# Patient Record
Sex: FEMALE | Race: WHITE | NOT HISPANIC OR LATINO | Employment: UNEMPLOYED | URBAN - METROPOLITAN AREA
[De-identification: names, ages, dates, MRNs, and addresses within clinical notes are randomized per-mention and may not be internally consistent; named-entity substitution may affect disease eponyms.]

---

## 2017-06-09 ENCOUNTER — OFFICE VISIT (OUTPATIENT)
Dept: CARDIOLOGY | Facility: MEDICAL CENTER | Age: 52
End: 2017-06-09
Payer: MEDICAID

## 2017-06-09 ENCOUNTER — TELEPHONE (OUTPATIENT)
Dept: CARDIOLOGY | Facility: MEDICAL CENTER | Age: 52
End: 2017-06-09

## 2017-06-09 VITALS
HEART RATE: 84 BPM | WEIGHT: 246 LBS | SYSTOLIC BLOOD PRESSURE: 144 MMHG | HEIGHT: 65 IN | OXYGEN SATURATION: 93 % | BODY MASS INDEX: 40.98 KG/M2 | DIASTOLIC BLOOD PRESSURE: 90 MMHG

## 2017-06-09 DIAGNOSIS — I50.9 CONGESTIVE HEART FAILURE, UNSPECIFIED CONGESTIVE HEART FAILURE CHRONICITY, UNSPECIFIED CONGESTIVE HEART FAILURE TYPE: ICD-10-CM

## 2017-06-09 DIAGNOSIS — Z82.49 FAMILY HISTORY OF PREMATURE CAD: ICD-10-CM

## 2017-06-09 DIAGNOSIS — R00.2 PALPITATIONS: ICD-10-CM

## 2017-06-09 DIAGNOSIS — R06.09 DOE (DYSPNEA ON EXERTION): ICD-10-CM

## 2017-06-09 DIAGNOSIS — E66.01 MORBID OBESITY WITH BMI OF 40.0-44.9, ADULT (HCC): ICD-10-CM

## 2017-06-09 LAB — EKG IMPRESSION: NORMAL

## 2017-06-09 PROCEDURE — 93000 ELECTROCARDIOGRAM COMPLETE: CPT | Performed by: INTERNAL MEDICINE

## 2017-06-09 PROCEDURE — 99204 OFFICE O/P NEW MOD 45 MIN: CPT | Performed by: INTERNAL MEDICINE

## 2017-06-09 RX ORDER — POTASSIUM CHLORIDE 750 MG/1
10 CAPSULE, EXTENDED RELEASE ORAL 2 TIMES DAILY
COMMUNITY

## 2017-06-09 RX ORDER — FLUOXETINE 10 MG/1
10 CAPSULE ORAL DAILY
COMMUNITY

## 2017-06-09 RX ORDER — LOSARTAN POTASSIUM 50 MG/1
50 TABLET ORAL DAILY
COMMUNITY

## 2017-06-09 RX ORDER — FUROSEMIDE 20 MG/1
20 TABLET ORAL 2 TIMES DAILY
COMMUNITY

## 2017-06-09 NOTE — MR AVS SNAPSHOT
"Yessica Fry   2017 2:00 PM   Office Visit   MRN: [id number]    Department:  Heart [facility]   Dept Phone:  [phone]    Description:  Female : 1965   Provider:  Timothy Arenas MD,[facility]           Reason for Visit     New Member Activation     CHF (Acute)           Allergies as of 2017     Allergen Noted Reactions    Keflex 2017   Unspecified    Pt. States she ended up in the hospital      You were diagnosed with     Congestive heart failure, unspecified congestive heart failure chronicity, unspecified congestive heart failure type (CMS-[facility])   [1756933]       Morbid obesity with BMI of 40.0-44.9, adult (CMS-[facility])   [680681]       Peripartum cardiomyopathy   [673206]   1994    Palpitations   [785.1.ICD-9-CM]       SANTOS (dyspnea on exertion)   [102462]       Family history of premature CAD   [528642]   Father 54 y/o MI and       Vital Signs     Blood Pressure Pulse Height Weight Body Mass Index Oxygen Saturation    144/90 mmHg 84 1.651 m (5' 5\") 111.585 kg (246 lb) 40.94 kg/m2 93%    Smoking Status                   Never Smoker            Basic Information     Date Of Birth Sex Race Ethnicity Preferred Language    1965 Female Unable to Obtain Non- English      Your appointments     2017 12:15 PM   ECHO with ECHO [facility] EXAM 12   ECHOCARDIOLOGY [facility] ([facility])    [address] [facility] [address] [address]   [phone]            2017  2:00 PM   HOLTER MONITOR 48 HRS with [facility]   [facility] (--)    [address]  [place] [id number] [phone]            Aug 09, 2017  1:15 PM   FOLLOW UP with Timothy Arenas MD,[facility] (--)    [address]  [address]  [facility] [id number] [phone]              Health Maintenance     Patient has no pending health maintenance at this time      Results       Current Immunizations     No " immunizations on file.      Below and/or attached are the medications your provider expects you to take. Review all of your home medications and newly ordered medications with your provider and/or pharmacist. Follow medication instructions as directed by your provider and/or pharmacist. Please keep your medication list with you and share with your provider. Update the information when medications are discontinued, doses are changed, or new medications (including over-the-counter products) are added; and carry medication information at all times in the event of emergency situations     Allergies:  KEFLEX - Unspecified               Medications  Valid as of: June 09, 2017 -  2:44 PM    Generic Name Brand Name Tablet Size Instructions for use    FLUoxetine HCl (Cap) PROZAC 10 MG Take 10 mg by mouth every day.        Furosemide (Tab) LASIX 20 MG Take 20 mg by mouth 2 times a day.        Losartan Potassium (Tab) COZAAR 50 MG Take 50 mg by mouth every day.        Metoprolol Tartrate (Tab) LOPRESSOR 25 MG Take 25 mg by mouth 2 times a day.        Potassium Chloride (Cap CR) MICRO-K 10 MEQ Take 10 mEq by mouth 2 times a day.        .                 Medicines prescribed today were [facility]:     [facility] [address] [address]    Phone: [phone] Fax: [phone]    Open 24 Hours?: No      Medication refill instructions:       If your prescription bottle indicates you have medication refills left, it is not necessary to call your provider’s office. Please contact your pharmacy and they will refill your medication.    If your prescription bottle indicates you do not have any refills left, you may request refills at any time through one of the following ways: The online [employer] system (except Urgent Care), by calling your provider’s office, or by asking your pharmacy to contact your provider’s office with a refill request. Medication refills are processed only during regular  business hours and may not be available until the next business day. Your provider may request additional information or to have a follow-up visit with you prior to refilling your medication.   *Please Note: Medication refills are assigned a new Rx number when refilled electronically. Your pharmacy may indicate that no refills were authorized even though a new prescription for the same medication is available at the pharmacy. Please request the medicine by name with the pharmacy before contacting your provider for a refill.        Your To Do List     Future Labs/Procedures Complete By Expires    COMP METABOLIC PANEL  As directed 6/9/2018    ECHOCARDIOGRAM COMP W/O CONT  As directed 6/9/2018    HOLTER MONITOR STUDY  As directed 6/9/2018    MAGNESIUM  As directed 6/9/2018    NM-CARDIAC STRESS TEST  As directed 12/7/2017    TSH  As directed 6/9/2018         [employer] Access Code: [email or web address]  Expires: 7/2/2017  1:40 PM    [employer]  A secure, online tool to manage your health information     [employer]® is a secure, online tool that connects you to your personalized health information from the privacy of your home -- day or night - making it very easy for you to manage your healthcare. Once the activation process is completed, you can even access your medical information using the [employer] alexei, which is available for free in the Apple Alexei store or Google Play store.     [employer] provides the following levels of access (as shown below):   My Chart Features   [facility] Doctor [facility]  Specialists [facility]  Urgent  Care [facility]  Primary Care  Doctor   Email your healthcare team securely and privately 24/7 X X X    Manage appointments: schedule your next appointment; view details of past/upcoming appointments X      Request prescription refills. X      View recent personal medical records, including lab and immunizations X X X X   View health record, including health history, allergies, medications X  X X X   Read reports about your outpatient visits, procedures, consult and ER notes X X X X   See your discharge summary, which is a recap of your hospital and/or ER visit that includes your diagnosis, lab results, and care plan. X X       How to register for [employer]:  1. Go to  [email or web address].  2. Click on the Sign Up Now box, which takes you to the New Member Sign Up page. You will need to provide the following information:  a. Enter your [employer] Access Code exactly as it appears at the top of this page. (You will not need to use this code after you’ve completed the sign-up process. If you do not sign up before the expiration date, you must request a new code.)   b. Enter your date of birth.   c. Enter your home email address.   d. Click Submit, and follow the next screen’s instructions.  3. Create a [employer] ID. This will be your [employer] login ID and cannot be changed, so think of one that is secure and easy to remember.  4. Create a [employer] password. You can change your password at any time.  5. Enter your Password Reset Question and Answer. This can be used at a later time if you forget your password.   6. Enter your e-mail address. This allows you to receive e-mail notifications when new information is available in [employer].  7. Click Sign Up. You can now view your health information.    For assistance activating your [employer] account, call [phone]

## 2017-06-09 NOTE — PROGRESS NOTES
Cardiology Consult Note:    Timothy Arenas  Date & Time note created:    6/9/2017   2:22 PM       Patient ID:  Name:             Yessica Fry     YOB: 1965  Age:                 51 y.o.  female   MRN:               [id number]                                                             Chief Complaint:   Chief Complaint   Patient presents with   • New Member Activation   • CHF (Acute)             History of Present Illness:   Yessica Fry has peripartum CM 1994 [place], [place]; and recurrent CHF last yr; Doing well recently; Abn EKG, c/o increased palpitation; Sleeps problem and anxiety;     Review of Systems:     Constitutional: Denies fevers, Denies weight changes  Eyes: Denies changes in vision, no eye pain  Ears/Nose/Throat/Mouth: Denies nasal congestion or sore throat   Cardiovascular: Denies chest pain but palpitations   Respiratory: Denies shortness of breath , Some cough  Gastrointestinal/Hepatic: Denies abdominal pain, nausea, vomiting, diarrhea, constipation or GI bleeding   Genitourinary: Denies bladder dysfunction, dysuria or frequency  Musculoskeletal/Rheum: Denies  joint pain and swelling   Skin/Breast: Denies rash, denies breast lumps or discharge  Neurological: Denies headache, confusion, memory loss or focal weakness/parasthesias  Psychiatric: denies mood disorder   Endocrine: denies hx of diabetes or thyroid dysfunction  Heme/Oncology/Lymph Nodes: Denies enlarged lymph nodes, denies bruising or known bleeding disorder  Allergic/Immunologic: hx of allergies      All other systems were reviewed and are negative (AMA/CMS criteria)    Past Medical History:   History reviewed. See above and below mentioned    Past Surgical History:  History reviewed. No pertinent past surgical history.    Hospital Medications:    Current outpatient prescriptions:   •  losartan (COZAAR) 50 MG Tab, Take 50 mg by mouth every day., Disp: , Rfl:   •  fluoxetine (PROZAC) 10 MG Cap, Take 10 mg by mouth every  "day., Disp: , Rfl:   •  furosemide (LASIX) 20 MG Tab, Take 20 mg by mouth 2 times a day., Disp: , Rfl:   •  metoprolol (LOPRESSOR) 25 MG Tab, Take 25 mg by mouth 2 times a day., Disp: , Rfl:   •  potassium chloride (MICRO-K) 10 MEQ capsule, Take 10 mEq by mouth 2 times a day., Disp: , Rfl:     Current Outpatient Medications:  Current Outpatient Prescriptions   Medication Sig Dispense Refill   • losartan (COZAAR) 50 MG Tab Take 50 mg by mouth every day.     • fluoxetine (PROZAC) 10 MG Cap Take 10 mg by mouth every day.     • furosemide (LASIX) 20 MG Tab Take 20 mg by mouth 2 times a day.     • metoprolol (LOPRESSOR) 25 MG Tab Take 25 mg by mouth 2 times a day.     • potassium chloride (MICRO-K) 10 MEQ capsule Take 10 mEq by mouth 2 times a day.       No current facility-administered medications for this visit.         Medication Allergy/Sensitivities:  Allergies   Allergen Reactions   • Keflex Unspecified     Pt. States she ended up in the hospital       Family History:  F hx premature CAD  History reviewed.     Social History:  Social History     Social History   • Marital Status: Unknown     Spouse Name: N/A   • Number of Children: N/A   • Years of Education: N/A     Occupational History   • Not on file.     Social History Main Topics   • Smoking status: Never Smoker    • Smokeless tobacco: Never Used   • Alcohol Use: Not on file   • Drug Use: Not on file   • Sexual Activity: Not on file     Other Topics Concern   • Not on file     Social History Narrative   • No narrative on file         Physical Exam:  Vitals  Weight/BMI: Body mass index is 40.94 kg/(m^2).  Blood pressure 144/90, pulse 84, height 1.651 m (5' 5\"), weight 111.585 kg (246 lb), SpO2 93 %.  Filed Vitals:    06/09/17 1404   BP: 144/90   Pulse: 84   Height: 1.651 m (5' 5\")   Weight: 111.585 kg (246 lb)   SpO2: 93%     Oxygen Therapy:  Pulse Oximetry: 93 %  General Appearance:  MO;  Well developed, Well nourished, No acute distress, Non-toxic appearance. "   HENT:  Normocephalic, Atraumatic, Oropharynx moist mucous membranes, Dentition: Mallampati 4 OP, Nose normal.    Eyes:  PERRLA, EOMI, Conjunctiva normal, No discharge.  Neck:  Normal range of motion, No cervical tenderness, Supple, No stridor, no JVD .  No thyromegaly.  No carotid bruit.  Cardiovascular:  Normal heart rate, Normal rhythm,  S1, S2, no S3,  S4; No gallops; No murmurs, No rubs, .   Extremitites with intact distal pulses, no cyanosis, clubbing or edema.  No heaves, thrills, HJR;  Peripheral pulses: carotid 2+, brachial 2+, radial 2+, ulnar 2+, femoral 2+, popliteal 2+, PT 2+, DP 2+;  Lungs:  Respiratory effort is normal. Normal breath sounds, breath sounds clear to auscultation bilaterally,  no rales, no rhonchi, no wheezing.   Abdomen: Bowel sounds normal, Soft, No tenderness, No guarding, No rebound, No masses, No hepatosplenomegaly.  Skin: Warm, Dry, No erythema, No rash, no induration or crepitus.  Neurologic: Alert & oriented x 3, Normal motor function, Normal sensory function, No focal deficits noted, cranial nerves II through XII are normal,  normal gait.  Psychiatric: Affect normal, Judgment normal, Mood normal.      Data Review:     Records reviewed and summarized in current documentation    Lab Data Review:  No results found for: SODIUM, POTASSIUM, CHLORIDE, CO2, GLUCOSE, BUN, CREATININE, BUNCREATRAT, GLOMRATE   No results found for: PROTHROMBTM, INR   No results found for: WBC, RBC, HEMOGLOBIN, HEMATOCRIT, MCV, MCH, MCHC, MPV, NEUTSPOLYS, LYMPHOCYTES, MONOCYTES, EOSINOPHILS, BASOPHILS, HYPOCHROMIA, ANISOCYTOSIS     Imaging/Procedures Review:    To my review shows:na    EKG To my review shows:SR BL LAD, with ant MI age indetermined; abn T waves;  QTc 500 msec(?)    Assessment and Plan.   51 y.o. female has peripartum CM with increased SANTOS; pls see orders for eval; Discussed with patient the OPO ordered, may order O2 supplement and/or  sleep evaluation if OPO findings are abnormal; the  patient will be contacted in the future regarding my advisement.      1. Congestive heart failure, unspecified congestive heart failure chronicity, unspecified congestive heart failure type (CMS-[facility])    - EKG  - ECHOCARDIOGRAM COMP W/O CONT; Future  - HOLTER MONITOR STUDY; Future  - CBC WITHOUT DIFFERENTIAL  - COMP METABOLIC PANEL; Future  - LIPID PANEL  - TSH; Future  - MAGNESIUM; Future    2. Morbid obesity with BMI of 40.0-44.9, adult (CMS-HCC)  RD consult  DASH diet    3. Peripartum cardiomyopathy  Discussed risks and educated about the subject related matters    - ECHOCARDIOGRAM COMP W/O CONT; Future    4. Palpitations    - HOLTER MONITOR STUDY; Future  - CBC WITHOUT DIFFERENTIAL  - COMP METABOLIC PANEL; Future  - TSH; Future  - MAGNESIUM; Future      Return to clinic in  2 months  1. Congestive heart failure, unspecified congestive heart failure chronicity, unspecified congestive heart failure type (CMS-[facility])  EKG    ECHOCARDIOGRAM COMP W/O CONT    HOLTER MONITOR STUDY    CBC WITHOUT DIFFERENTIAL    COMP METABOLIC PANEL    LIPID PANEL    TSH    MAGNESIUM   2. Morbid obesity with BMI of 40.0-44.9, adult (CMS-HCC)     3. Peripartum cardiomyopathy  ECHOCARDIOGRAM COMP W/O CONT    1994   4. Palpitations  HOLTER MONITOR STUDY    CBC WITHOUT DIFFERENTIAL    COMP METABOLIC PANEL    TSH    MAGNESIUM

## 2017-06-09 NOTE — Clinical Note
[facility]   [address]   [address]  [place] [address]  Phone: [phone]  Fax: [phone]              Yessica Fry  1965    Encounter Date: 6/9/2017    Maria Luz Claire M.D.    Thank you for the referral. I had the pleasure of seeing Yessica Fry today in cardiology clinic. I've attached my visit note below. If you have any questions please feel free to give me a call anytime.      Timothy Arenas MD, PhD, FACC  Cardiology and Lipidology  [facility]                                                                  PROGRESS NOTE:  Cardiology Consult Note:    Timothy Arenas  Date & Time note created:    6/9/2017   2:22 PM       Patient ID:  Name:             Yessica Fry     YOB: 1965  Age:                 51 y.o.  female   MRN:               [id number]                                                             Chief Complaint:   Chief Complaint   Patient presents with   • New Member Activation   • CHF (Acute)             History of Present Illness:   Yessica Fry has peripartum CM 1994 [place], [place]; and recurrent CHF last yr; Doing well recently; Abn EKG, c/o increased palpitation; Sleeps problem and anxiety;     Review of Systems:     Constitutional: Denies fevers, Denies weight changes  Eyes: Denies changes in vision, no eye pain  Ears/Nose/Throat/Mouth: Denies nasal congestion or sore throat   Cardiovascular: Denies chest pain but palpitations   Respiratory: Denies shortness of breath , Some cough  Gastrointestinal/Hepatic: Denies abdominal pain, nausea, vomiting, diarrhea, constipation or GI bleeding   Genitourinary: Denies bladder dysfunction, dysuria or frequency  Musculoskeletal/Rheum: Denies  joint pain and swelling   Skin/Breast: Denies rash, denies breast lumps or discharge  Neurological: Denies headache, confusion, memory loss or focal  weakness/parasthesias  Psychiatric: denies mood disorder   Endocrine: denies hx of diabetes or thyroid dysfunction  Heme/Oncology/Lymph Nodes: Denies enlarged lymph nodes, denies bruising or known bleeding disorder  Allergic/Immunologic: hx of allergies      All other systems were reviewed and are negative (AMA/CMS criteria)    Past Medical History:   History reviewed. See above and below mentioned    Past Surgical History:  History reviewed. No pertinent past surgical history.    Hospital Medications:    Current outpatient prescriptions:   •  losartan (COZAAR) 50 MG Tab, Take 50 mg by mouth every day., Disp: , Rfl:   •  fluoxetine (PROZAC) 10 MG Cap, Take 10 mg by mouth every day., Disp: , Rfl:   •  furosemide (LASIX) 20 MG Tab, Take 20 mg by mouth 2 times a day., Disp: , Rfl:   •  metoprolol (LOPRESSOR) 25 MG Tab, Take 25 mg by mouth 2 times a day., Disp: , Rfl:   •  potassium chloride (MICRO-K) 10 MEQ capsule, Take 10 mEq by mouth 2 times a day., Disp: , Rfl:     Current Outpatient Medications:  Current Outpatient Prescriptions   Medication Sig Dispense Refill   • losartan (COZAAR) 50 MG Tab Take 50 mg by mouth every day.     • fluoxetine (PROZAC) 10 MG Cap Take 10 mg by mouth every day.     • furosemide (LASIX) 20 MG Tab Take 20 mg by mouth 2 times a day.     • metoprolol (LOPRESSOR) 25 MG Tab Take 25 mg by mouth 2 times a day.     • potassium chloride (MICRO-K) 10 MEQ capsule Take 10 mEq by mouth 2 times a day.       No current facility-administered medications for this visit.         Medication Allergy/Sensitivities:  Allergies   Allergen Reactions   • Keflex Unspecified     Pt. States she ended up in the hospital       Family History:  F hx premature CAD  History reviewed.     Social History:  Social History     Social History   • Marital Status: Unknown     Spouse Name: N/A   • Number of Children: N/A   • Years of Education: N/A     Occupational History   • Not on file.     Social History Main Topics   •  "Smoking status: Never Smoker    • Smokeless tobacco: Never Used   • Alcohol Use: Not on file   • Drug Use: Not on file   • Sexual Activity: Not on file     Other Topics Concern   • Not on file     Social History Narrative   • No narrative on file         Physical Exam:  Vitals  Weight/BMI: Body mass index is 40.94 kg/(m^2).  Blood pressure 144/90, pulse 84, height 1.651 m (5' 5\"), weight 111.585 kg (246 lb), SpO2 93 %.  Filed Vitals:    06/09/17 1404   BP: 144/90   Pulse: 84   Height: 1.651 m (5' 5\")   Weight: 111.585 kg (246 lb)   SpO2: 93%     Oxygen Therapy:  Pulse Oximetry: 93 %  General Appearance:  MO;  Well developed, Well nourished, No acute distress, Non-toxic appearance.   HENT:  Normocephalic, Atraumatic, Oropharynx moist mucous membranes, Dentition: Mallampati 4 OP, Nose normal.    Eyes:  PERRLA, EOMI, Conjunctiva normal, No discharge.  Neck:  Normal range of motion, No cervical tenderness, Supple, No stridor, no JVD .  No thyromegaly.  No carotid bruit.  Cardiovascular:  Normal heart rate, Normal rhythm,  S1, S2, no S3,  S4; No gallops; No murmurs, No rubs, .   Extremitites with intact distal pulses, no cyanosis, clubbing or edema.  No heaves, thrills, HJR;  Peripheral pulses: carotid 2+, brachial 2+, radial 2+, ulnar 2+, femoral 2+, popliteal 2+, PT 2+, DP 2+;  Lungs:  Respiratory effort is normal. Normal breath sounds, breath sounds clear to auscultation bilaterally,  no rales, no rhonchi, no wheezing.   Abdomen: Bowel sounds normal, Soft, No tenderness, No guarding, No rebound, No masses, No hepatosplenomegaly.  Skin: Warm, Dry, No erythema, No rash, no induration or crepitus.  Neurologic: Alert & oriented x 3, Normal motor function, Normal sensory function, No focal deficits noted, cranial nerves II through XII are normal,  normal gait.  Psychiatric: Affect normal, Judgment normal, Mood normal.      Data Review:     Records reviewed and summarized in current documentation    Lab Data Review:  No " results found for: SODIUM, POTASSIUM, CHLORIDE, CO2, GLUCOSE, BUN, CREATININE, BUNCREATRAT, GLOMRATE   No results found for: PROTHROMBTM, INR   No results found for: WBC, RBC, HEMOGLOBIN, HEMATOCRIT, MCV, MCH, MCHC, MPV, NEUTSPOLYS, LYMPHOCYTES, MONOCYTES, EOSINOPHILS, BASOPHILS, HYPOCHROMIA, ANISOCYTOSIS     Imaging/Procedures Review:    To my review shows:na    EKG To my review shows:SR BL LAD, with ant MI age indetermined; abn T waves;  QTc 500 msec(?)    Assessment and Plan.   51 y.o. female has peripartum CM with increased SANTOS; pls see orders for eval; Discussed with patient the OPO ordered, may order O2 supplement and/or  sleep evaluation if OPO findings are abnormal; the patient will be contacted in the future regarding my advisement.      1. Congestive heart failure, unspecified congestive heart failure chronicity, unspecified congestive heart failure type (CMS-HCC)    - EKG  - ECHOCARDIOGRAM COMP W/O CONT; Future  - HOLTER MONITOR STUDY; Future  - CBC WITHOUT DIFFERENTIAL  - COMP METABOLIC PANEL; Future  - LIPID PANEL  - TSH; Future  - MAGNESIUM; Future    2. Morbid obesity with BMI of 40.0-44.9, adult (CMS-HCC)  RD consult  DASH diet    3. Peripartum cardiomyopathy  Discussed risks and educated about the subject related matters    - ECHOCARDIOGRAM COMP W/O CONT; Future    4. Palpitations    - HOLTER MONITOR STUDY; Future  - CBC WITHOUT DIFFERENTIAL  - COMP METABOLIC PANEL; Future  - TSH; Future  - MAGNESIUM; Future      Return to clinic in  2 months  1. Congestive heart failure, unspecified congestive heart failure chronicity, unspecified congestive heart failure type (CMS-HCC)  EKG    ECHOCARDIOGRAM COMP W/O CONT    HOLTER MONITOR STUDY    CBC WITHOUT DIFFERENTIAL    COMP METABOLIC PANEL    LIPID PANEL    TSH    MAGNESIUM   2. Morbid obesity with BMI of 40.0-44.9, adult (CMS-HCC)     3. Peripartum cardiomyopathy  ECHOCARDIOGRAM COMP W/O CONT    1994   4. Palpitations  HOLTER MONITOR STUDY    CBC WITHOUT  DIFFERENTIAL    COMP METABOLIC PANEL    TSH    MAGNESIUM         Maria Luz Claire M.D.  [address] [facility] [address]  VIA Facsimile: [phone]

## 2017-07-12 ENCOUNTER — TELEPHONE (OUTPATIENT)
Dept: CARDIOLOGY | Facility: MEDICAL CENTER | Age: 52
End: 2017-07-12

## 2017-07-12 ENCOUNTER — NON-PROVIDER VISIT (OUTPATIENT)
Dept: CARDIOLOGY | Facility: MEDICAL CENTER | Age: 52
End: 2017-07-12
Payer: MEDICAID

## 2017-07-12 ENCOUNTER — HOSPITAL ENCOUNTER (OUTPATIENT)
Dept: CARDIOLOGY | Facility: MEDICAL CENTER | Age: 52
End: 2017-07-12
Attending: INTERNAL MEDICINE
Payer: MEDICAID

## 2017-07-12 DIAGNOSIS — G47.34 NOCTURNAL HYPOXEMIA: ICD-10-CM

## 2017-07-12 DIAGNOSIS — I50.9 CONGESTIVE HEART FAILURE, UNSPECIFIED CONGESTIVE HEART FAILURE CHRONICITY, UNSPECIFIED CONGESTIVE HEART FAILURE TYPE: ICD-10-CM

## 2017-07-12 DIAGNOSIS — I49.3 PVC (PREMATURE VENTRICULAR CONTRACTION): ICD-10-CM

## 2017-07-12 DIAGNOSIS — R00.0 SINUS TACHYCARDIA: ICD-10-CM

## 2017-07-12 DIAGNOSIS — R00.2 PALPITATIONS: ICD-10-CM

## 2017-07-12 LAB
LV EJECT FRACT  99904: 55
LV EJECT FRACT MOD 2C 99903: 56.69
LV EJECT FRACT MOD 4C 99902: 49.27
LV EJECT FRACT MOD BP 99901: 60.54

## 2017-07-12 PROCEDURE — 93306 TTE W/DOPPLER COMPLETE: CPT | Mod: 26 | Performed by: INTERNAL MEDICINE

## 2017-07-12 PROCEDURE — 93306 TTE W/DOPPLER COMPLETE: CPT

## 2017-07-12 NOTE — TELEPHONE ENCOUNTER
----- Message from Timothy Arenas MD,[facility] sent at 6/27/2017 12:23 PM PDT -----  Sleep study; Thx  ----- Message -----     From: Rosette Carlin     Sent: 6/27/2017  11:53 AM       To: Rosette Carlin, Timothy Arenas MD,[facility]    OPO abnormal, AYAKA high do you want sleep study - I can try to order oxygen while we wait for sleep study due to CHF diagnosis  - please advise

## 2017-07-13 NOTE — TELEPHONE ENCOUNTER
L/M for patient to return my call to go over OPO and echo results and let her know that [facility] would like her to have a sleep study (referral already placed in computer) - instructed patient to call me back

## 2017-07-14 NOTE — TELEPHONE ENCOUNTER
I informed her and she will comply.  I gave her the phone # for scheduling and she will call [facility].  Also needs a clearance for GI (colonoscopy).  To Dr. Arenas.

## 2017-07-17 DIAGNOSIS — R00.2 PALPITATIONS: ICD-10-CM

## 2017-07-17 DIAGNOSIS — I50.9 CONGESTIVE HEART FAILURE, UNSPECIFIED CONGESTIVE HEART FAILURE CHRONICITY, UNSPECIFIED CONGESTIVE HEART FAILURE TYPE: ICD-10-CM

## 2017-07-21 LAB — EKG IMPRESSION: NORMAL

## 2017-07-21 PROCEDURE — 93224 XTRNL ECG REC UP TO 48 HRS: CPT | Performed by: INTERNAL MEDICINE

## 2017-07-28 ENCOUNTER — APPOINTMENT (OUTPATIENT)
Dept: SLEEP MEDICINE | Facility: MEDICAL CENTER | Age: 52
End: 2017-07-28
Payer: MEDICAID

## 2017-08-04 ENCOUNTER — OFFICE VISIT (OUTPATIENT)
Dept: CARDIOLOGY | Facility: MEDICAL CENTER | Age: 52
End: 2017-08-04
Payer: MEDICAID

## 2017-08-04 VITALS
OXYGEN SATURATION: 95 % | DIASTOLIC BLOOD PRESSURE: 84 MMHG | BODY MASS INDEX: 41.32 KG/M2 | SYSTOLIC BLOOD PRESSURE: 124 MMHG | HEART RATE: 70 BPM | WEIGHT: 248 LBS | HEIGHT: 65 IN

## 2017-08-04 DIAGNOSIS — G47.34 NOCTURNAL HYPOXEMIA: ICD-10-CM

## 2017-08-04 DIAGNOSIS — R06.09 DOE (DYSPNEA ON EXERTION): ICD-10-CM

## 2017-08-04 DIAGNOSIS — R00.2 PALPITATIONS: ICD-10-CM

## 2017-08-04 DIAGNOSIS — I50.9 CONGESTIVE HEART FAILURE, UNSPECIFIED CONGESTIVE HEART FAILURE CHRONICITY, UNSPECIFIED CONGESTIVE HEART FAILURE TYPE: ICD-10-CM

## 2017-08-04 DIAGNOSIS — Z82.49 FAMILY HISTORY OF PREMATURE CAD: ICD-10-CM

## 2017-08-04 DIAGNOSIS — E66.01 MORBID OBESITY WITH BMI OF 40.0-44.9, ADULT (HCC): ICD-10-CM

## 2017-08-04 PROCEDURE — 99214 OFFICE O/P EST MOD 30 MIN: CPT | Performed by: INTERNAL MEDICINE

## 2017-08-04 NOTE — PROGRESS NOTES
Chief Complaint   Patient presents with   • Follow-Up   • Medical Clearance         This patient is an established female who is here today to discuss:  peripartum CM 1994 [place], [place]; and recurrent CHF last yr; Doing well recently; Abn EKG, c/o increased palpitation; Sleeps problem and anxiety;     There are no active problems to display for this patient.      No past medical history on file.  No past surgical history on file.  Social History     Social History   • Marital Status: Unknown     Spouse Name: N/A   • Number of Children: N/A   • Years of Education: N/A     Social History Main Topics   • Smoking status: Never Smoker    • Smokeless tobacco: Never Used   • Alcohol Use: Not on file   • Drug Use: Not on file   • Sexual Activity: Not on file     Other Topics Concern   • Not on file     Social History Narrative   • No narrative on file     Family History   Problem Relation Age of Onset   • Heart Failure Father        Current Outpatient Prescriptions   Medication Sig Dispense Refill   • losartan (COZAAR) 50 MG Tab Take 50 mg by mouth every day.     • fluoxetine (PROZAC) 10 MG Cap Take 10 mg by mouth every day.     • furosemide (LASIX) 20 MG Tab Take 20 mg by mouth 2 times a day.     • metoprolol (LOPRESSOR) 25 MG Tab Take 25 mg by mouth 2 times a day.     • potassium chloride (MICRO-K) 10 MEQ capsule Take 10 mEq by mouth 2 times a day.       No current facility-administered medications for this visit.     Keflex    Review of Systems:     Constitutional: Denies fevers, Denies weight changes  Eyes: Denies changes in vision, no eye pain  Ears/Nose/Throat/Mouth: Denies nasal congestion or sore throat   Cardiovascular: Denies chest pain but  palpitations   Respiratory: Denies shortness of breath , Denies cough  Gastrointestinal/Hepatic: Denies abdominal pain, nausea, vomiting, diarrhea, constipation or GI bleeding   Genitourinary: Denies bladder dysfunction, dysuria or frequency  Musculoskeletal/Rheum: Denies  " joint pain and swelling   Skin/Breast: Denies rash, denies breast lumps or discharge  Neurological: Denies headache, confusion, memory loss or focal weakness/parasthesias  Psychiatric: denies mood disorder   Endocrine: denies hx of diabetes or thyroid dysfunction  Heme/Oncology/Lymph Nodes: Denies enlarged lymph nodes, denies brusing or known bleeding disorder  Allergic/Immunologic: Denies hx of allergies      All other systems were reviewed and are negative (AMA/CMS criteria)      Blood pressure 124/84, pulse 70, height 1.651 m (5' 5\"), weight 112.492 kg (248 lb), SpO2 95 %.  General Appearance:  MO;  Well developed, Well nourished, No acute distress, Non-toxic appearance.   HENT:  Normocephalic, Atraumatic, Oropharynx moist mucous membranes, Dentition: Mallampati 4 OP, Nose normal.    Eyes:  PERRLA, EOMI, Conjunctiva normal, No discharge.  Neck:  Normal range of motion, No cervical tenderness, Supple, No stridor, no JVD .  No thyromegaly.  No carotid bruit.  Cardiovascular:  Normal heart rate, Normal rhythm,  S1, S2, no S3,  S4; No gallops; No murmurs, No rubs, .   Extremitites with intact distal pulses, no cyanosis, clubbing or edema.  No heaves, thrills, HJR;  Peripheral pulses: carotid 2+, brachial 2+, radial 2+, ulnar 2+, femoral 2+, popliteal 2+, PT 2+, DP 2+;  Lungs:  Respiratory effort is normal. Normal breath sounds, breath sounds clear to auscultation bilaterally,  no rales, no rhonchi, no wheezing.   Abdomen: Bowel sounds normal, Soft, No tenderness, No guarding, No rebound, No masses, No hepatosplenomegaly.  Skin: Warm, Dry, No erythema, No rash, no induration or crepitus.  Neurologic: Alert & oriented x 3, Normal motor function, Normal sensory function, No focal deficits noted, cranial nerves II through XII are normal,  normal gait.  Psychiatric: Affect normal, Judgment normal, Mood normal.    7/12/2017 TTE: No prior study is available for comparison.   Normal left ventricular chamber size.  Left " ventricular ejection fraction is visually estimated to be 55%.  Grade I diastolic dysfunction.  Trace mitral regurgitation.  Trace tricuspid regurgitation.  Trace pulmonic insufficiency.  No pericardial effusion seen.    Assessment and Plan.   52 y.o. female Holter reviewed with low freq of VE and SVE and no AFib; Echo looks normal but waiting for stress test result; Pre-OP for colonoscopy with her h/o CM and recent cardiac sx's;     1. Congestive heart failure, unspecified congestive heart failure chronicity, unspecified congestive heart failure type (CMS-[facility])  Echo normal    2. Palpitations  stable    3. Peripartum cardiomyopathy  reviewed    4. Morbid obesity with BMI of 40.0-44.9, adult (CMS-[facility])  stable    5. SANTOS (dyspnea on exertion)  discussed    6. Nocturnal hypoxemia  O2 and Possible sleep study    7. Family history of premature CAD  reviewed      Return to clinic in  3 , months    1. Congestive heart failure, unspecified congestive heart failure chronicity, unspecified congestive heart failure type (CMS-[facility])     2. Palpitations     3. Peripartum cardiomyopathy     4. Morbid obesity with BMI of 40.0-44.9, adult (CMS-HCC)     5. SANTOS (dyspnea on exertion)     6. Nocturnal hypoxemia  CANCELED: REFERRAL TO SLEEP STUDIES   7. Family history of premature CAD

## 2017-08-04 NOTE — Clinical Note
[facility]   [address]   [address]  [place] [address]  Phone: [phone]  Fax: [phone]              Yessica Fry  1965    Encounter Date: 8/4/2017    Maria Luz Claire M.D.    Thank you for the referral. I had the pleasure of seeing Yessica Fry today in cardiology clinic. I've attached my visit note below. If you have any questions please feel free to give me a call anytime.      Timothy Arenas MD, PhD, [facility]  Cardiology and Lipidology  [facility]                                                                PROGRESS NOTE:  Chief Complaint   Patient presents with   • Follow-Up   • Medical Clearance         This patient is an established female who is here today to discuss:  peripartum CM 1994 [place], [place]; and recurrent CHF last yr; Doing well recently; Abn EKG, c/o increased palpitation; Sleeps problem and anxiety;     There are no active problems to display for this patient.      No past medical history on file.  No past surgical history on file.  Social History     Social History   • Marital Status: Unknown     Spouse Name: N/A   • Number of Children: N/A   • Years of Education: N/A     Social History Main Topics   • Smoking status: Never Smoker    • Smokeless tobacco: Never Used   • Alcohol Use: Not on file   • Drug Use: Not on file   • Sexual Activity: Not on file     Other Topics Concern   • Not on file     Social History Narrative   • No narrative on file     Family History   Problem Relation Age of Onset   • Heart Failure Father        Current Outpatient Prescriptions   Medication Sig Dispense Refill   • losartan (COZAAR) 50 MG Tab Take 50 mg by mouth every day.     • fluoxetine (PROZAC) 10 MG Cap Take 10 mg by mouth every day.     • furosemide (LASIX) 20 MG Tab Take 20 mg by mouth 2 times a day.     • metoprolol (LOPRESSOR) 25 MG Tab Take 25 mg by mouth 2 times a day.     •  "potassium chloride (MICRO-K) 10 MEQ capsule Take 10 mEq by mouth 2 times a day.       No current facility-administered medications for this visit.     Keflex    Review of Systems:     Constitutional: Denies fevers, Denies weight changes  Eyes: Denies changes in vision, no eye pain  Ears/Nose/Throat/Mouth: Denies nasal congestion or sore throat   Cardiovascular: Denies chest pain but  palpitations   Respiratory: Denies shortness of breath , Denies cough  Gastrointestinal/Hepatic: Denies abdominal pain, nausea, vomiting, diarrhea, constipation or GI bleeding   Genitourinary: Denies bladder dysfunction, dysuria or frequency  Musculoskeletal/Rheum: Denies  joint pain and swelling   Skin/Breast: Denies rash, denies breast lumps or discharge  Neurological: Denies headache, confusion, memory loss or focal weakness/parasthesias  Psychiatric: denies mood disorder   Endocrine: denies hx of diabetes or thyroid dysfunction  Heme/Oncology/Lymph Nodes: Denies enlarged lymph nodes, denies brusing or known bleeding disorder  Allergic/Immunologic: Denies hx of allergies      All other systems were reviewed and are negative (AMA/CMS criteria)      Blood pressure 124/84, pulse 70, height 1.651 m (5' 5\"), weight 112.492 kg (248 lb), SpO2 95 %.  General Appearance:  MO;  Well developed, Well nourished, No acute distress, Non-toxic appearance.   HENT:  Normocephalic, Atraumatic, Oropharynx moist mucous membranes, Dentition: Mallampati 4 OP, Nose normal.    Eyes:  PERRLA, EOMI, Conjunctiva normal, No discharge.  Neck:  Normal range of motion, No cervical tenderness, Supple, No stridor, no JVD .  No thyromegaly.  No carotid bruit.  Cardiovascular:  Normal heart rate, Normal rhythm,  S1, S2, no S3,  S4; No gallops; No murmurs, No rubs, .   Extremitites with intact distal pulses, no cyanosis, clubbing or edema.  No heaves, thrills, HJR;  Peripheral pulses: carotid 2+, brachial 2+, radial 2+, ulnar 2+, femoral 2+, popliteal 2+, PT 2+, DP " 2+;  Lungs:  Respiratory effort is normal. Normal breath sounds, breath sounds clear to auscultation bilaterally,  no rales, no rhonchi, no wheezing.   Abdomen: Bowel sounds normal, Soft, No tenderness, No guarding, No rebound, No masses, No hepatosplenomegaly.  Skin: Warm, Dry, No erythema, No rash, no induration or crepitus.  Neurologic: Alert & oriented x 3, Normal motor function, Normal sensory function, No focal deficits noted, cranial nerves II through XII are normal,  normal gait.  Psychiatric: Affect normal, Judgment normal, Mood normal.    7/12/2017 TTE: No prior study is available for comparison.   Normal left ventricular chamber size.  Left ventricular ejection fraction is visually estimated to be 55%.  Grade I diastolic dysfunction.  Trace mitral regurgitation.  Trace tricuspid regurgitation.  Trace pulmonic insufficiency.  No pericardial effusion seen.    Assessment and Plan.   52 y.o. female Holter reviewed with low freq of VE and SVE and no AFib; Echo looks normal but waiting for stress test result; Pre-OP for colonoscopy with her h/o CM and recent cardiac sx's;     1. Congestive heart failure, unspecified congestive heart failure chronicity, unspecified congestive heart failure type (CMS-[facility])  Echo normal    2. Palpitations  stable    3. Peripartum cardiomyopathy  reviewed    4. Morbid obesity with BMI of 40.0-44.9, adult (CMS-HCC)  stable    5. SANTOS (dyspnea on exertion)  discussed    6. Nocturnal hypoxemia  O2 and Possible sleep study    7. Family history of premature CAD  reviewed      Return to clinic in  3 , months    1. Congestive heart failure, unspecified congestive heart failure chronicity, unspecified congestive heart failure type (CMS-HCC)     2. Palpitations     3. Peripartum cardiomyopathy     4. Morbid obesity with BMI of 40.0-44.9, adult (CMS-HCC)     5. SANTOS (dyspnea on exertion)     6. Nocturnal hypoxemia  CANCELED: REFERRAL TO SLEEP STUDIES   7. Family history of premature CAD                Maria Luz Claire M.D.  [address] [facility] [address]  VIA Facsimile: [phone]

## 2017-08-04 NOTE — MR AVS SNAPSHOT
"Yessica Ang   2017 10:30 AM   Office Visit   MRN: [id number]    Department:  Heart [facility]   Dept Phone:  [phone]    Description:  Female : 1965   Provider[facility]  [facility]           Reason for Visit     Follow-Up     Medical Clearance           Allergies as of 2017     Allergen Noted Reactions    Keflex 2017   Unspecified    Pt. States she ended up in the hospital      You were diagnosed with     Congestive heart failure, unspecified congestive heart failure chronicity, unspecified congestive heart failure type (CMS-[facility])   [3642372]       Palpitations   [785.1.ICD-9-CM]       Peripartum cardiomyopathy   [440478]       Morbid obesity with BMI of 40.0-44.9, adult (CMS-[facility])   [248415]       SANTOS (dyspnea on exertion)   [511805]       Nocturnal hypoxemia   [667244]       Family history of premature CAD   [855623]         Vital Signs     Blood Pressure Pulse Height Weight Body Mass Index Oxygen Saturation    124/84 mmHg 70 1.651 m (5' 5\") 112.492 kg (248 lb) 41.27 kg/m2 95%    Smoking Status                   Never Smoker            Basic Information     Date Of Birth Sex Race Ethnicity Preferred Language    1965 Female White Non- English      Your appointments     Aug 07, 2017  1:30 PM   NM CARDIAC STRESS TEST (30) with [facility] NM FORTE 1   [facility] ([facility])    [address] [facility]  [place] [address] [phone]           Some exams require specific prep instructions that would have been given to you at time of scheduling. If you have any additional questions about the prep instructions, please call Imaging Scheduling at [phone] and press #2.            Aug 14, 2017  1:00 PM   PREVIOUS PATIENT with JULIETH Pulido   [facility] (--)    [address]  [address]  [place] [address] [phone]            Oct 25, 2017  2:20 PM   New Patient with C " Rotation   [facility] (--)    [address] [place] [address] [phone]           Please bring enclosed paperwork completed along with your insurance card and photo ID.              Health Maintenance        Date Due Completion Dates    IMM DTaP/Tdap/Td Vaccine (1 - Tdap) 6/27/1983 ---    PAP SMEAR 6/27/1985 ---    MAMMOGRAM 6/27/2004 ---    COLONOSCOPY 6/27/2014 ---    IMM INFLUENZA (1) 9/1/2017 ---            Current Immunizations     No immunizations on file.      Below and/or attached are the medications your provider expects you to take. Review all of your home medications and newly ordered medications with your provider and/or pharmacist. Follow medication instructions as directed by your provider and/or pharmacist. Please keep your medication list with you and share with your provider. Update the information when medications are discontinued, doses are changed, or new medications (including over-the-counter products) are added; and carry medication information at all times in the event of emergency situations     Allergies:  KEFLEX - Unspecified               Medications  Valid as of: August 04, 2017 - 10:33 AM    Generic Name Brand Name Tablet Size Instructions for use    FLUoxetine HCl (Cap) PROZAC 10 MG Take 10 mg by mouth every day.        Furosemide (Tab) LASIX 20 MG Take 20 mg by mouth 2 times a day.        Losartan Potassium (Tab) COZAAR 50 MG Take 50 mg by mouth every day.        Metoprolol Tartrate (Tab) LOPRESSOR 25 MG Take 25 mg by mouth 2 times a day.        Potassium Chloride (Cap CR) MICRO-K 10 MEQ Take 10 mEq by mouth 2 times a day.        .                 Medicines prescribed today were sent to:     [facility] [place] [facility] [address]    [address] [place] [address] [place] [address]    Phone: [phone] Fax: [phone]    Open 24 Hours?: No      Medication refill instructions:       If your prescription bottle indicates you have medication  refills left, it is not necessary to call your provider’s office. Please contact your pharmacy and they will refill your medication.    If your prescription bottle indicates you do not have any refills left, you may request refills at any time through one of the following ways: The online [employer] system (except Urgent Care), by calling your provider’s office, or by asking your pharmacy to contact your provider’s office with a refill request. Medication refills are processed only during regular business hours and may not be available until the next business day. Your provider may request additional information or to have a follow-up visit with you prior to refilling your medication.   *Please Note: Medication refills are assigned a new Rx number when refilled electronically. Your pharmacy may indicate that no refills were authorized even though a new prescription for the same medication is available at the pharmacy. Please request the medicine by name with the pharmacy before contacting your provider for a refill.        Referral     A referral request has been sent to our patient care coordination department. Please allow 3-5 business days for us to process this request and contact you either by phone or mail. If you do not hear from us by the 5th business day, please call us at [phone].           [employer] Access Code: [email or web address]  Expires: 8/11/2017  2:33 PM    [employer]  A secure, online tool to manage your health information     [employer]® is a secure, online tool that connects you to your personalized health information from the privacy of your home -- day or night - making it very easy for you to manage your healthcare. Once the activation process is completed, you can even access your medical information using the [employer] alexei, which is available for free in the Apple Alexei store or Google Play store.     [employer] provides the following levels of access (as shown below):   My Chart Features    [facility] Doctor [facility]  Specialists [facility]  Urgent  Care Non-[facility]  Primary Care  Doctor   Email your healthcare team securely and privately 24/7 X X X    Manage appointments: schedule your next appointment; view details of past/upcoming appointments X      Request prescription refills. X      View recent personal medical records, including lab and immunizations X X X X   View health record, including health history, allergies, medications X X X X   Read reports about your outpatient visits, procedures, consult and ER notes X X X X   See your discharge summary, which is a recap of your hospital and/or ER visit that includes your diagnosis, lab results, and care plan. X X       How to register for [employer]:  1. Go to  [email or web address].  2. Click on the Sign Up Now box, which takes you to the New Member Sign Up page. You will need to provide the following information:  a. Enter your [employer] Access Code exactly as it appears at the top of this page. (You will not need to use this code after you’ve completed the sign-up process. If you do not sign up before the expiration date, you must request a new code.)   b. Enter your date of birth.   c. Enter your home email address.   d. Click Submit, and follow the next screen’s instructions.  3. Create a [employer] ID. This will be your [employer] login ID and cannot be changed, so think of one that is secure and easy to remember.  4. Create a [employer] password. You can change your password at any time.  5. Enter your Password Reset Question and Answer. This can be used at a later time if you forget your password.   6. Enter your e-mail address. This allows you to receive e-mail notifications when new information is available in [employer].  7. Click Sign Up. You can now view your health information.    For assistance activating your [employer] account, call [phone]

## 2017-08-07 ENCOUNTER — HOSPITAL ENCOUNTER (OUTPATIENT)
Dept: RADIOLOGY | Facility: MEDICAL CENTER | Age: 52
End: 2017-08-07
Attending: INTERNAL MEDICINE
Payer: MEDICAID

## 2017-08-07 DIAGNOSIS — I50.9 CONGESTIVE HEART FAILURE, UNSPECIFIED CONGESTIVE HEART FAILURE CHRONICITY, UNSPECIFIED CONGESTIVE HEART FAILURE TYPE: ICD-10-CM

## 2017-08-07 DIAGNOSIS — Z82.49 FAMILY HISTORY OF PREMATURE CAD: ICD-10-CM

## 2017-08-07 DIAGNOSIS — R06.09 DOE (DYSPNEA ON EXERTION): ICD-10-CM

## 2017-08-07 PROCEDURE — 700111 HCHG RX REV CODE 636 W/ 250 OVERRIDE (IP)

## 2017-08-07 PROCEDURE — A9502 TC99M TETROFOSMIN: HCPCS

## 2017-08-07 RX ORDER — REGADENOSON 0.08 MG/ML
INJECTION, SOLUTION INTRAVENOUS
Status: COMPLETED
Start: 2017-08-07 | End: 2017-08-07

## 2017-08-07 RX ADMIN — REGADENOSON 0.4 MG: 0.08 INJECTION, SOLUTION INTRAVENOUS at 14:11

## 2017-08-14 ENCOUNTER — OFFICE VISIT (OUTPATIENT)
Dept: CARDIOLOGY | Facility: MEDICAL CENTER | Age: 52
End: 2017-08-14
Payer: MEDICAID

## 2017-08-14 VITALS
OXYGEN SATURATION: 93 % | HEIGHT: 65 IN | BODY MASS INDEX: 41.32 KG/M2 | WEIGHT: 248 LBS | DIASTOLIC BLOOD PRESSURE: 72 MMHG | SYSTOLIC BLOOD PRESSURE: 104 MMHG | HEART RATE: 74 BPM

## 2017-08-14 DIAGNOSIS — I50.9 CONGESTIVE HEART FAILURE, UNSPECIFIED CONGESTIVE HEART FAILURE CHRONICITY, UNSPECIFIED CONGESTIVE HEART FAILURE TYPE: ICD-10-CM

## 2017-08-14 DIAGNOSIS — Z86.79 HISTORY OF CHF (CONGESTIVE HEART FAILURE): ICD-10-CM

## 2017-08-14 DIAGNOSIS — F32.A DEPRESSION, UNSPECIFIED DEPRESSION TYPE: ICD-10-CM

## 2017-08-14 DIAGNOSIS — E66.3 OVERWEIGHT(278.02): ICD-10-CM

## 2017-08-14 DIAGNOSIS — R00.2 PALPITATIONS: ICD-10-CM

## 2017-08-14 DIAGNOSIS — G47.34 NOCTURNAL HYPOXIA: ICD-10-CM

## 2017-08-14 PROCEDURE — 99214 OFFICE O/P EST MOD 30 MIN: CPT | Performed by: NURSE PRACTITIONER

## 2017-08-14 RX ORDER — ATORVASTATIN CALCIUM 20 MG/1
20 TABLET, FILM COATED ORAL NIGHTLY
COMMUNITY
End: 2022-10-25

## 2017-08-14 ASSESSMENT — ENCOUNTER SYMPTOMS
CHILLS: 0
NAUSEA: 0
ORTHOPNEA: 0
HEADACHES: 0
DIZZINESS: 0
FEVER: 0
SHORTNESS OF BREATH: 0
MYALGIAS: 0
ABDOMINAL PAIN: 0
COUGH: 0
PALPITATIONS: 0
BRUISES/BLEEDS EASILY: 0
PND: 0
LOSS OF CONSCIOUSNESS: 0

## 2017-08-14 NOTE — Clinical Note
[facility]   [address]   [address]  [place] [address]  Phone: [phone]  Fax: [phone]              Yessica Fry  1965    Encounter Date: 8/14/2017    JULIETH Pulido          PROGRESS NOTE:  Subjective:   Yessica Fry is a 52 y.o. female who presents today for follow-up of history of CHF and post-partum cardiomyopathy.    Yessica is a 52 year old female with history of post-partum cardiomyopathy in 1995, with last episode of CHF about a year ago, when she was off of her medications.  She also has nocturnal hypoxia (per OPO) and depression. She is having a colonoscopy and need cardiac clearance.    Generally, she feels fine: no chest pain, pressure or discomfort; no palpitations or fluttering of her heart; no shortness of breath, orthopnea or PND; no dizziness or syncope; no edema at all. BP has been stable.    Past Medical History   Diagnosis Date   • Cardiomyopathy, peripartum, postpartum 1995 1995: Initial episode. August 2017: Echocardiogram with normal LV size, LVEF 55%. Trace MR, trace TR. MPI with no ischemia or infarct, LVEF 42%.   • History of CHF (congestive heart failure)    • Nocturnal hypoxia    • Depression    • Overweight      History reviewed. No pertinent past surgical history.  Family History   Problem Relation Age of Onset   • Heart Failure Father      History   Smoking status   • Never Smoker    Smokeless tobacco   • Never Used     Allergies   Allergen Reactions   • Keflex Unspecified     Pt. States she ended up in the hospital     Outpatient Encounter Prescriptions as of 8/14/2017   Medication Sig Dispense Refill   • atorvastatin (LIPITOR) 20 MG Tab Take 20 mg by mouth every evening.     • losartan (COZAAR) 50 MG Tab Take 50 mg by mouth every day.     • fluoxetine (PROZAC) 10 MG Cap Take 10 mg by mouth every day.     • furosemide (LASIX) 20 MG Tab Take 20 mg by mouth 2 times a day.     • metoprolol  "(LOPRESSOR) 25 MG Tab Take 25 mg by mouth 2 times a day.     • potassium chloride (MICRO-K) 10 MEQ capsule Take 10 mEq by mouth 2 times a day.       No facility-administered encounter medications on file as of 8/14/2017.     Review of Systems   Constitutional: Negative for fever and chills.   HENT: Negative for congestion.    Respiratory: Negative for cough and shortness of breath.    Cardiovascular: Negative for chest pain, palpitations, orthopnea, leg swelling and PND.   Gastrointestinal: Negative for nausea and abdominal pain.   Musculoskeletal: Negative for myalgias.   Skin: Negative for rash.   Neurological: Negative for dizziness, loss of consciousness and headaches.   Endo/Heme/Allergies: Does not bruise/bleed easily.        Objective:   /72 mmHg  Pulse 74  Ht 1.651 m (5' 5\")  Wt 112.492 kg (248 lb)  BMI 41.27 kg/m2  SpO2 93%    Physical Exam   Constitutional: She is oriented to person, place, and time. She appears well-developed and well-nourished. No distress.   She is overweight (BMI 41.27)   HENT:   Head: Normocephalic.   Eyes: Conjunctivae and EOM are normal.   Neck: Normal range of motion. Neck supple. No JVD present.   Cardiovascular: Normal rate, regular rhythm, normal heart sounds and intact distal pulses.  Exam reveals no gallop and no friction rub.    No murmur heard.  Pulmonary/Chest: Effort normal and breath sounds normal.   Abdominal: Soft. Bowel sounds are normal.   Musculoskeletal: Normal range of motion. She exhibits no edema.   Neurological: She is alert and oriented to person, place, and time.   Skin: Skin is warm and dry. No rash noted. No erythema.   Psychiatric: She has a normal mood and affect. Her behavior is normal.     CONCLUSIONS OF ECHOCARDIOGRAM OF 7/12/2017 - REVIEWED WITH PATIENT:  No prior study is available for comparison.   Normal left ventricular chamber size.  Left ventricular ejection fraction is visually estimated to be 55%.  Grade I diastolic " dysfunction.  Trace mitral regurgitation.  Trace tricuspid regurgitation.  Trace pulmonic insufficiency.  No pericardial effusion seen.    NUCLEAR IMAGING INTERPRETATION OF 8/7/2017 - REVIEWED WITH PATIENT:   Negative stress ECG for ischemia.   No evidence of significant jeopardized viable myocardium or prior myocardial    infarction.   Normal myocardial perfusion with no ischemia.   Normal left ventricular size, ejection fraction, and wall motion.   Normal left ventricular wall motion.  LV ejection fraction = 42%.   ECG INTERPRETATION   Negative stress ECG for ischemia.    Assessment:     1. Postpartum cardiomyopathy     2. History of CHF (congestive heart failure)     3. Nocturnal hypoxia     4. Depression, unspecified depression type     5. Overweight         Medical Decision Making:  Today's Assessment / Status / Plan:     1. History of post-partum cardiomyopathy, with negative/normal echocardiogram and MPI. She can proceed with colonoscopy.  Continue with same medications.    2. Nocturnal hypoxia, per OPO, to be evaluated by pulmonology.    3. Depression, treated, stable.    4. Overweight.    Same medications for now. FU with Dr. Arenas in 3 months, sooner if clinical condition changes.    Collaborating MD: Efraín Cartwright [employer]

## 2017-08-14 NOTE — MR AVS SNAPSHOT
"        Yessica Ang   2017 1:00 PM   Office Visit   MRN: [id number]    Department:  [facility]   Dept Phone:  [phone]    Description:  Female : 1965   Provider:  JULIETH Pulido           Reason for Visit     Follow-Up           Allergies as of 2017     Allergen Noted Reactions    Keflex 2017   Unspecified    Pt. States she ended up in the hospital      You were diagnosed with     Postpartum cardiomyopathy   [853590]       History of CHF (congestive heart failure)   [642704]       Nocturnal hypoxia   [613469]       Depression, unspecified depression type   [9196227]       Overweight   [278.02.ICD-9-CM]         Vital Signs     Blood Pressure Pulse Height Weight Body Mass Index Oxygen Saturation    104/72 mmHg 74 1.651 m (5' 5\") 112.492 kg (248 lb) 41.27 kg/m2 93%    Smoking Status                   Never Smoker            Basic Information     Date Of Birth Sex Race Ethnicity Preferred Language    1965 Female White Non- English      Your appointments     Oct 25, 2017  2:20 PM   New Patient with C Rotation   [facility] (--)    [address] [place] [id number] [phone]           Please bring enclosed paperwork completed along with your insurance card and photo ID.              Problem List              ICD-10-CM Priority Class Noted - Resolved    Postpartum cardiomyopathy O90.3   2017 - Present    History of CHF (congestive heart failure) Z86.79   2017 - Present    Nocturnal hypoxia G47.34   2017 - Present    Depression F32.9   2017 - Present    Overweight E66.3   2017 - Present      Health Maintenance        Date Due Completion Dates    IMM DTaP/Tdap/Td Vaccine (1 - Tdap) 1983 ---    PAP SMEAR 1985 ---    MAMMOGRAM 2004 ---    COLONOSCOPY 2014 ---    IMM INFLUENZA (1) 2017 ---            Current Immunizations     No immunizations on file.      Below and/or attached are " the medications your provider expects you to take. Review all of your home medications and newly ordered medications with your provider and/or pharmacist. Follow medication instructions as directed by your provider and/or pharmacist. Please keep your medication list with you and share with your provider. Update the information when medications are discontinued, doses are changed, or new medications (including over-the-counter products) are added; and carry medication information at all times in the event of emergency situations     Allergies:  KEFLEX - Unspecified               Medications  Valid as of: August 14, 2017 -  1:56 PM    Generic Name Brand Name Tablet Size Instructions for use    Atorvastatin Calcium (Tab) LIPITOR 20 MG Take 20 mg by mouth every evening.        FLUoxetine HCl (Cap) PROZAC 10 MG Take 10 mg by mouth every day.        Furosemide (Tab) LASIX 20 MG Take 20 mg by mouth 2 times a day.        Losartan Potassium (Tab) COZAAR 50 MG Take 50 mg by mouth every day.        Metoprolol Tartrate (Tab) LOPRESSOR 25 MG Take 25 mg by mouth 2 times a day.        Potassium Chloride (Cap CR) MICRO-K 10 MEQ Take 10 mEq by mouth 2 times a day.        .                 Medicines prescribed today were sent to:     [facility] - [address] [address]    Phone: [phone] Fax: [phone]    Open 24 Hours?: No      Medication refill instructions:       If your prescription bottle indicates you have medication refills left, it is not necessary to call your provider’s office. Please contact your pharmacy and they will refill your medication.    If your prescription bottle indicates you do not have any refills left, you may request refills at any time through one of the following ways: The online Rapt system (except Urgent Care), by calling your provider’s office, or by asking your pharmacy to contact your provider’s office with a refill request. Medication refills are  processed only during regular business hours and may not be available until the next business day. Your provider may request additional information or to have a follow-up visit with you prior to refilling your medication.   *Please Note: Medication refills are assigned a new Rx number when refilled electronically. Your pharmacy may indicate that no refills were authorized even though a new prescription for the same medication is available at the pharmacy. Please request the medicine by name with the pharmacy before contacting your provider for a refill.           [employer]P Group Access Code: [email or web address]  Expires: 9/13/2017  1:56 PM    STP Group  A secure, online tool to manage your health information     [employer]’s STP Group® is a secure, online tool that connects you to your personalized health information from the privacy of your home -- day or night - making it very easy for you to manage your healthcare. Once the activation process is completed, you can even access your medical information using the STP Group alexei, which is available for free in the Apple Alexei store or Google Play store.     STP Group provides the following levels of access (as shown below):   My Chart Features   [facility] Doctor [facility]  Urgent  Care Non-[facility]  Primary Care  Doctor   Email your healthcare team securely and privately 24/7 X X X    Manage appointments: schedule your next appointment; view details of past/upcoming appointments X      Request prescription refills. X      View recent personal medical records, including lab and immunizations X X X X   View health record, including health history, allergies, medications X X X X   Read reports about your outpatient visits, procedures, consult and ER notes X X X X   See your discharge summary, which is a recap of your hospital and/or ER visit that includes your diagnosis, lab results, and care plan. X X       How to register for [employer]P Group:  1. Go to   [email or web address].  2. Click on the Sign Up Now box, which takes you to the New Member Sign Up page. You will need to provide the following information:  a. Enter your Par[employer]weet Access Code exactly as it appears at the top of this page. (You will not need to use this code after you’ve completed the sign-up process. If you do not sign up before the expiration date, you must request a new code.)   b. Enter your date of birth.   c. Enter your home email address.   d. Click Submit, and follow the next screen’s instructions.  3. Create a Parakweet ID. This will be your Parakweet login ID and cannot be changed, so think of one that is secure and easy to remember.  4. Create a Rapid[employer] password. You can change your password at any time.  5. Enter your Password Reset Question and Answer. This can be used at a later time if you forget your password.   6. Enter your e-mail address. This allows you to receive e-mail notifications when new information is available in Parakweet.  7. Click Sign Up. You can now view your health information.    For assistance activating your [employer] account, call [phone]

## 2017-08-14 NOTE — Clinical Note
PROCEDURE/SURGERY CLEARANCE FORM      Encounter Date: 8/14/2017    Patient: Yessica Fry  YOB: 1965    CARDIOLOGIST:  JULIETH Pulido    REFERRING DOCTOR:  Maria Luz Claire M.D.      The above patient is considered a LOW risk and is cleared to have the following procedure: colonoscopy                                           Additional comments: attached are ed of recent echocardiogram and MPI.                 MD Signature   JULIETH Pulido

## 2017-08-14 NOTE — PROGRESS NOTES
Subjective:   Yessica Fry is a 52 y.o. female who presents today for follow-up of history of CHF and post-partum cardiomyopathy.    Yessica is a 52 year old female with history of post-partum cardiomyopathy in 1995, with last episode of CHF about a year ago, when she was off of her medications.  She also has nocturnal hypoxia (per OPO) and depression. She is having a colonoscopy and need cardiac clearance.    Generally, she feels fine: no chest pain, pressure or discomfort; no palpitations or fluttering of her heart; no shortness of breath, orthopnea or PND; no dizziness or syncope; no edema at all. BP has been stable.    Past Medical History   Diagnosis Date   • Cardiomyopathy, peripartum, postpartum 1995 1995: Initial episode. August 2017: Echocardiogram with normal LV size, LVEF 55%. Trace MR, trace TR. MPI with no ischemia or infarct, LVEF 42%.   • History of CHF (congestive heart failure)    • Nocturnal hypoxia    • Depression    • Overweight      History reviewed. No pertinent past surgical history.  Family History   Problem Relation Age of Onset   • Heart Failure Father      History   Smoking status   • Never Smoker    Smokeless tobacco   • Never Used     Allergies   Allergen Reactions   • Keflex Unspecified     Pt. States she ended up in the hospital     Outpatient Encounter Prescriptions as of 8/14/2017   Medication Sig Dispense Refill   • atorvastatin (LIPITOR) 20 MG Tab Take 20 mg by mouth every evening.     • losartan (COZAAR) 50 MG Tab Take 50 mg by mouth every day.     • fluoxetine (PROZAC) 10 MG Cap Take 10 mg by mouth every day.     • furosemide (LASIX) 20 MG Tab Take 20 mg by mouth 2 times a day.     • metoprolol (LOPRESSOR) 25 MG Tab Take 25 mg by mouth 2 times a day.     • potassium chloride (MICRO-K) 10 MEQ capsule Take 10 mEq by mouth 2 times a day.       No facility-administered encounter medications on file as of 8/14/2017.     Review of Systems   Constitutional: Negative for fever and  "chills.   HENT: Negative for congestion.    Respiratory: Negative for cough and shortness of breath.    Cardiovascular: Negative for chest pain, palpitations, orthopnea, leg swelling and PND.   Gastrointestinal: Negative for nausea and abdominal pain.   Musculoskeletal: Negative for myalgias.   Skin: Negative for rash.   Neurological: Negative for dizziness, loss of consciousness and headaches.   Endo/Heme/Allergies: Does not bruise/bleed easily.        Objective:   /72 mmHg  Pulse 74  Ht 1.651 m (5' 5\")  Wt 112.492 kg (248 lb)  BMI 41.27 kg/m2  SpO2 93%    Physical Exam   Constitutional: She is oriented to person, place, and time. She appears well-developed and well-nourished. No distress.   She is overweight (BMI 41.27)   HENT:   Head: Normocephalic.   Eyes: Conjunctivae and EOM are normal.   Neck: Normal range of motion. Neck supple. No JVD present.   Cardiovascular: Normal rate, regular rhythm, normal heart sounds and intact distal pulses.  Exam reveals no gallop and no friction rub.    No murmur heard.  Pulmonary/Chest: Effort normal and breath sounds normal.   Abdominal: Soft. Bowel sounds are normal.   Musculoskeletal: Normal range of motion. She exhibits no edema.   Neurological: She is alert and oriented to person, place, and time.   Skin: Skin is warm and dry. No rash noted. No erythema.   Psychiatric: She has a normal mood and affect. Her behavior is normal.     CONCLUSIONS OF ECHOCARDIOGRAM OF 7/12/2017 - REVIEWED WITH PATIENT:  No prior study is available for comparison.   Normal left ventricular chamber size.  Left ventricular ejection fraction is visually estimated to be 55%.  Grade I diastolic dysfunction.  Trace mitral regurgitation.  Trace tricuspid regurgitation.  Trace pulmonic insufficiency.  No pericardial effusion seen.    NUCLEAR IMAGING INTERPRETATION OF 8/7/2017 - REVIEWED WITH PATIENT:   Negative stress ECG for ischemia.   No evidence of significant jeopardized viable myocardium " or prior myocardial    infarction.   Normal myocardial perfusion with no ischemia.   Normal left ventricular size, ejection fraction, and wall motion.   Normal left ventricular wall motion.  LV ejection fraction = 42%.   ECG INTERPRETATION   Negative stress ECG for ischemia.    Assessment:     1. Postpartum cardiomyopathy     2. History of CHF (congestive heart failure)     3. Nocturnal hypoxia     4. Depression, unspecified depression type     5. Overweight         Medical Decision Making:  Today's Assessment / Status / Plan:     1. History of post-partum cardiomyopathy, with negative/normal echocardiogram and MPI. She can proceed with colonoscopy.  Continue with same medications.    2. Nocturnal hypoxia, per OPO, to be evaluated by pulmonology.    3. Depression, treated, stable.    4. Overweight.    Same medications for now. FU with Dr. Arenas in 3 months, sooner if clinical condition changes.    Collaborating MD: Efraín

## 2017-08-15 ENCOUNTER — TELEPHONE (OUTPATIENT)
Dept: CARDIOLOGY | Facility: MEDICAL CENTER | Age: 52
End: 2017-08-15

## 2017-08-16 NOTE — TELEPHONE ENCOUNTER
Clearance for colonoscopy faxed to Dr. Maria Luz Claire at [facility] at [phone] - received fax transmission confirmation

## 2017-11-06 ENCOUNTER — SLEEP CENTER VISIT (OUTPATIENT)
Dept: SLEEP MEDICINE | Facility: MEDICAL CENTER | Age: 52
End: 2017-11-06
Payer: MEDICAID

## 2017-11-06 VITALS
HEART RATE: 68 BPM | SYSTOLIC BLOOD PRESSURE: 110 MMHG | RESPIRATION RATE: 16 BRPM | OXYGEN SATURATION: 93 % | BODY MASS INDEX: 39.99 KG/M2 | HEIGHT: 65 IN | DIASTOLIC BLOOD PRESSURE: 74 MMHG | WEIGHT: 240 LBS

## 2017-11-06 DIAGNOSIS — G47.33 OBSTRUCTIVE SLEEP APNEA HYPOPNEA, SEVERE: ICD-10-CM

## 2017-11-06 PROCEDURE — 99204 OFFICE O/P NEW MOD 45 MIN: CPT | Performed by: INTERNAL MEDICINE

## 2017-11-06 NOTE — PATIENT INSTRUCTIONS
1. We have ordered a sleep study to evaluate for obstructive sleep apnea  2. Recommend avoiding alcohol and sedatives and to not operate a motor vehicle while drowsy

## 2017-11-06 NOTE — PROGRESS NOTES
Yessica Fry is a 52 y.o. female here for obstructive sleep apnea.  Patient was referred by Dr. Claire.    History of Present Illness:    The patient is a 52-year-old female comes in for sleep apnea. She was diagnosed with sleep apnea 6 years ago and she was prescribed CPAP. She states that she can only use it for 3 days and because she was unable to sleep or that she return the machine. Patient has a history of congestive heart failure which appears to be related to postpartum. She has hypertension depression and anxiety fibromyalgia and degenerative joint disease. She has an elevated BMI. She complains of being a mouth breather. She snores loudly. She knows that she stops breathing in her sleep. She wakes up gasping and choking. She also wakes up with sweats. She says she is always tired and fatigued and sleepy during the daytime. An overnight oximetry was performed which showed a low oxygen saturation of 68% and the baseline oxygen saturation was 93%. The oxygen desaturation index was 51 per hour. She denies restless legs or nocturnal kicking or jerking. She denies sleepwalking or seizures and does not physically active at her dreams.      Constitutional:  Negative for fever, chills, sweats, and fatigue.  Eyes:  Negative for eye pain and visual changes.  HENT:  Negative for tinnitus and hoarse voice.  Cardiovascular:  Negative for chest pain, leg swelling, syncope and orthopnea.  Respiratory:  See HPI for pertinent negatives  Sleep: Positive for somnolence, loud snoring, sleep disturbance due to breathing, insomnia.  Gastrointestinal:  Negative for dysphagia, nausea and abdominal pain.  Heme/lymph:  Denies easy bruising, blood clots.  Musculoskeletal:  Negative for arthralgias, sore muscles and back pain.  Skin:  Negative for rash and color change.  Neurological:  Negative for headaches, lightheadedness and weakness.  Psychiatric:  Denies depression.    Current Outpatient Prescriptions   Medication Sig Dispense  "Refill   • atorvastatin (LIPITOR) 20 MG Tab Take 20 mg by mouth every evening.     • losartan (COZAAR) 50 MG Tab Take 50 mg by mouth every day.     • fluoxetine (PROZAC) 10 MG Cap Take 10 mg by mouth every day.     • furosemide (LASIX) 20 MG Tab Take 20 mg by mouth 2 times a day.     • metoprolol (LOPRESSOR) 25 MG Tab Take 25 mg by mouth 2 times a day.     • potassium chloride (MICRO-K) 10 MEQ capsule Take 10 mEq by mouth 2 times a day.       No current facility-administered medications for this visit.        Social History   Substance Use Topics   • Smoking status: Never Smoker   • Smokeless tobacco: Never Used   • Alcohol use Not on file       Past Medical History:   Diagnosis Date   • Cardiomyopathy, peripartum, postpartum 1995 1995: Initial episode. August 2017: Echocardiogram with normal LV size, LVEF 55%. Trace MR, trace TR. MPI with no ischemia or infarct, LVEF 42%.   • Depression    • History of CHF (congestive heart failure)    • Nocturnal hypoxia    • Overweight        No past surgical history on file.    Allergies:  Keflex    Family History   Problem Relation Age of Onset   • Heart Failure Father        Physical Examination    Vitals:    11/06/17 1419   Height: 1.651 m (5' 5\")       Physical Exam:  Constitutional:  Well developed and well nourished.  Head:  Normocephalic and atraumatic.  Nose:  Nose normal.  Mouth/Throat:  Oropharynx is clear and moist, no lesions.  Mallampati class II  Eyes:  Conjunctivae and EOM are normal.  Pupils are equal, round, and reactive to light.  Neck:  Normal range of motion.  Supple.  No JVD. No tracheal deviation.  No thyromegally  Cardiovascular:  Normal rate, regular rhythm, normal heart sounds and intact distal pulses.  Pulmonary/Chest:  No accessory muscle use.  No wheezing, rales or rhonchi.  No dullness to percussion, tenderness or deformity.  Abdominal:  Soft.  No ascites.  No Hepatosplenomegally.  Non tender.  Musculoskeletal.  Normal range of motion.  No " muscular atrophy.  Lymphadenopathy:  No cervical or supraclavicular adenopathy  Neurological:  Alert and oriented.  Cranial nerves intact.  No focal deficits  Skin:  No rashes or ulcers.  Psyciatric:  Normal mood and affect.      Assessment and Plan:  1. Obstructive sleep apnea hypopnea, severe  The patient has known obstructive sleep apnea diagnosed 6 years ago but has gone essentially untreated. She continues to have significant symptoms of snoring and apnea and excessive daytime somnolence. She has comorbid conditions to include congestive heart failure, hypertension, depression and fibromyalgia. She is an elevated BMI. I have recommended a sleep study. She is willing to give CPAP another try. We reviewed CPAP desensitization exercises that she can do to help her become more acclimated to the device. She was advised to avoid alcohol and sedatives and not operate a motor vehicle while drowsy. Weight loss is recommended. We will see her back after the sleep study.  - POLYSOMNOGRAPHY, 4 OR MORE; Future          Followup Return in about 6 weeks (around 12/18/2017) for follow up with the sleep physician, follow up visit with ROSEY.

## 2018-01-11 ENCOUNTER — APPOINTMENT (OUTPATIENT)
Dept: SLEEP MEDICINE | Facility: MEDICAL CENTER | Age: 53
End: 2018-01-11
Attending: INTERNAL MEDICINE
Payer: MEDICAID

## 2022-10-25 ENCOUNTER — PRE-ADMISSION TESTING (OUTPATIENT)
Dept: ADMISSIONS | Facility: MEDICAL CENTER | Age: 57
End: 2022-10-25
Attending: STUDENT IN AN ORGANIZED HEALTH CARE EDUCATION/TRAINING PROGRAM
Payer: MEDICAID

## 2022-10-25 RX ORDER — OMEPRAZOLE 20 MG/1
20 CAPSULE, DELAYED RELEASE ORAL DAILY
COMMUNITY
Start: 2022-09-28

## 2022-10-25 RX ORDER — ATORVASTATIN CALCIUM 40 MG/1
40 TABLET, FILM COATED ORAL DAILY
COMMUNITY
Start: 2022-10-19

## 2022-10-25 RX ORDER — FENOFIBRATE 160 MG/1
160 TABLET ORAL DAILY
COMMUNITY
Start: 2022-10-21

## 2022-10-25 RX ORDER — DICLOFENAC SODIUM 25 MG/1
25 TABLET, DELAYED RELEASE ORAL 2 TIMES DAILY
COMMUNITY

## 2022-10-25 RX ORDER — FLUOXETINE HYDROCHLORIDE 40 MG/1
40 CAPSULE ORAL DAILY
COMMUNITY
Start: 2022-10-21 | End: 2022-10-25

## 2022-10-25 RX ORDER — ALBUTEROL SULFATE 90 UG/1
AEROSOL, METERED RESPIRATORY (INHALATION)
COMMUNITY

## 2022-10-25 RX ORDER — MULTIVIT WITH MINERALS/LUTEIN
TABLET ORAL
COMMUNITY

## 2022-10-25 NOTE — PREPROCEDURE INSTRUCTIONS
"Preadmit  appointment: \" Preparing for your Procedure information\" Instructions discussed with Patient.       Patient instructed to continue prescribed medications through the day before surgery, instructed to take the following medications the day of surgery per anesthesia protocol: metoprolol, albuterol as needed, omeprazole.        Pt states, \"no issues with anesthesia\".  Fasting guidelines discussed with Patient, patient will follow MD's instructions for Pre-Surgery Diet.      All Pt's questions and concerns answered or addressed.  Emailed all instructions.    "

## 2022-10-26 ENCOUNTER — ANESTHESIA (OUTPATIENT)
Dept: SURGERY | Facility: MEDICAL CENTER | Age: 57
End: 2022-10-26
Payer: MEDICAID

## 2022-10-26 ENCOUNTER — ANESTHESIA EVENT (OUTPATIENT)
Dept: SURGERY | Facility: MEDICAL CENTER | Age: 57
End: 2022-10-26
Payer: MEDICAID

## 2022-10-26 ENCOUNTER — APPOINTMENT (OUTPATIENT)
Dept: RADIOLOGY | Facility: MEDICAL CENTER | Age: 57
End: 2022-10-26
Attending: STUDENT IN AN ORGANIZED HEALTH CARE EDUCATION/TRAINING PROGRAM
Payer: MEDICAID

## 2022-10-26 ENCOUNTER — HOSPITAL ENCOUNTER (OUTPATIENT)
Facility: MEDICAL CENTER | Age: 57
End: 2022-10-27
Attending: STUDENT IN AN ORGANIZED HEALTH CARE EDUCATION/TRAINING PROGRAM | Admitting: STUDENT IN AN ORGANIZED HEALTH CARE EDUCATION/TRAINING PROGRAM
Payer: MEDICAID

## 2022-10-26 DIAGNOSIS — M17.12 OSTEOARTHRITIS OF LEFT KNEE, UNSPECIFIED OSTEOARTHRITIS TYPE: ICD-10-CM

## 2022-10-26 PROCEDURE — 700101 HCHG RX REV CODE 250: Performed by: ANESTHESIOLOGY

## 2022-10-26 PROCEDURE — C1776 JOINT DEVICE (IMPLANTABLE): HCPCS | Performed by: STUDENT IN AN ORGANIZED HEALTH CARE EDUCATION/TRAINING PROGRAM

## 2022-10-26 PROCEDURE — 502240 HCHG MISC OR SUPPLY RC 0272: Performed by: STUDENT IN AN ORGANIZED HEALTH CARE EDUCATION/TRAINING PROGRAM

## 2022-10-26 PROCEDURE — 160029 HCHG SURGERY MINUTES - 1ST 30 MINS LEVEL 4: Performed by: STUDENT IN AN ORGANIZED HEALTH CARE EDUCATION/TRAINING PROGRAM

## 2022-10-26 PROCEDURE — C1713 ANCHOR/SCREW BN/BN,TIS/BN: HCPCS | Performed by: STUDENT IN AN ORGANIZED HEALTH CARE EDUCATION/TRAINING PROGRAM

## 2022-10-26 PROCEDURE — 502000 HCHG MISC OR IMPLANTS RC 0278: Performed by: STUDENT IN AN ORGANIZED HEALTH CARE EDUCATION/TRAINING PROGRAM

## 2022-10-26 PROCEDURE — 700105 HCHG RX REV CODE 258: Performed by: STUDENT IN AN ORGANIZED HEALTH CARE EDUCATION/TRAINING PROGRAM

## 2022-10-26 PROCEDURE — 700101 HCHG RX REV CODE 250: Performed by: STUDENT IN AN ORGANIZED HEALTH CARE EDUCATION/TRAINING PROGRAM

## 2022-10-26 PROCEDURE — 76942 ECHO GUIDE FOR BIOPSY: CPT | Mod: 26 | Performed by: ANESTHESIOLOGY

## 2022-10-26 PROCEDURE — 700111 HCHG RX REV CODE 636 W/ 250 OVERRIDE (IP): Performed by: ANESTHESIOLOGY

## 2022-10-26 PROCEDURE — 96365 THER/PROPH/DIAG IV INF INIT: CPT | Mod: XU

## 2022-10-26 PROCEDURE — 64447 NJX AA&/STRD FEMORAL NRV IMG: CPT | Mod: XU | Performed by: STUDENT IN AN ORGANIZED HEALTH CARE EDUCATION/TRAINING PROGRAM

## 2022-10-26 PROCEDURE — 160002 HCHG RECOVERY MINUTES (STAT): Performed by: STUDENT IN AN ORGANIZED HEALTH CARE EDUCATION/TRAINING PROGRAM

## 2022-10-26 PROCEDURE — G0378 HOSPITAL OBSERVATION PER HR: HCPCS

## 2022-10-26 PROCEDURE — 700111 HCHG RX REV CODE 636 W/ 250 OVERRIDE (IP): Performed by: STUDENT IN AN ORGANIZED HEALTH CARE EDUCATION/TRAINING PROGRAM

## 2022-10-26 PROCEDURE — 01402 ANES OPN/ARTH TOT KNE ARTHRP: CPT | Performed by: ANESTHESIOLOGY

## 2022-10-26 PROCEDURE — 160009 HCHG ANES TIME/MIN: Performed by: STUDENT IN AN ORGANIZED HEALTH CARE EDUCATION/TRAINING PROGRAM

## 2022-10-26 PROCEDURE — 160035 HCHG PACU - 1ST 60 MINS PHASE I: Performed by: STUDENT IN AN ORGANIZED HEALTH CARE EDUCATION/TRAINING PROGRAM

## 2022-10-26 PROCEDURE — 96366 THER/PROPH/DIAG IV INF ADDON: CPT | Mod: XU

## 2022-10-26 PROCEDURE — 160041 HCHG SURGERY MINUTES - EA ADDL 1 MIN LEVEL 4: Performed by: STUDENT IN AN ORGANIZED HEALTH CARE EDUCATION/TRAINING PROGRAM

## 2022-10-26 PROCEDURE — 700102 HCHG RX REV CODE 250 W/ 637 OVERRIDE(OP): Performed by: STUDENT IN AN ORGANIZED HEALTH CARE EDUCATION/TRAINING PROGRAM

## 2022-10-26 PROCEDURE — 73560 X-RAY EXAM OF KNEE 1 OR 2: CPT | Mod: LT

## 2022-10-26 PROCEDURE — 160048 HCHG OR STATISTICAL LEVEL 1-5: Performed by: STUDENT IN AN ORGANIZED HEALTH CARE EDUCATION/TRAINING PROGRAM

## 2022-10-26 PROCEDURE — A9270 NON-COVERED ITEM OR SERVICE: HCPCS | Performed by: STUDENT IN AN ORGANIZED HEALTH CARE EDUCATION/TRAINING PROGRAM

## 2022-10-26 PROCEDURE — 64447 NJX AA&/STRD FEMORAL NRV IMG: CPT | Mod: 59,LT | Performed by: ANESTHESIOLOGY

## 2022-10-26 DEVICE — CEMENT ORTHOPEDIC HV US  (10/PK): Type: IMPLANTABLE DEVICE | Site: KNEE | Status: FUNCTIONAL

## 2022-10-26 DEVICE — IMPLANTABLE DEVICE: Type: IMPLANTABLE DEVICE | Site: KNEE | Status: FUNCTIONAL

## 2022-10-26 RX ORDER — TRANEXAMIC ACID 100 MG/ML
INJECTION, SOLUTION INTRAVENOUS PRN
Status: DISCONTINUED | OUTPATIENT
Start: 2022-10-26 | End: 2022-10-27 | Stop reason: SURG

## 2022-10-26 RX ORDER — ROPIVACAINE HYDROCHLORIDE 5 MG/ML
INJECTION, SOLUTION EPIDURAL; INFILTRATION; PERINEURAL
Status: DISCONTINUED | OUTPATIENT
Start: 2022-10-26 | End: 2022-10-26 | Stop reason: HOSPADM

## 2022-10-26 RX ORDER — LIDOCAINE HYDROCHLORIDE 20 MG/ML
INJECTION, SOLUTION EPIDURAL; INFILTRATION; INTRACAUDAL; PERINEURAL PRN
Status: DISCONTINUED | OUTPATIENT
Start: 2022-10-26 | End: 2022-10-27 | Stop reason: SURG

## 2022-10-26 RX ORDER — MIDAZOLAM HYDROCHLORIDE 1 MG/ML
INJECTION INTRAMUSCULAR; INTRAVENOUS PRN
Status: DISCONTINUED | OUTPATIENT
Start: 2022-10-26 | End: 2022-10-27 | Stop reason: SURG

## 2022-10-26 RX ORDER — DEXAMETHASONE SODIUM PHOSPHATE 4 MG/ML
INJECTION, SOLUTION INTRA-ARTICULAR; INTRALESIONAL; INTRAMUSCULAR; INTRAVENOUS; SOFT TISSUE PRN
Status: DISCONTINUED | OUTPATIENT
Start: 2022-10-26 | End: 2022-10-27 | Stop reason: SURG

## 2022-10-26 RX ORDER — HYDROMORPHONE HYDROCHLORIDE 1 MG/ML
0.4 INJECTION, SOLUTION INTRAMUSCULAR; INTRAVENOUS; SUBCUTANEOUS
Status: DISCONTINUED | OUTPATIENT
Start: 2022-10-26 | End: 2022-10-26 | Stop reason: HOSPADM

## 2022-10-26 RX ORDER — HYDROMORPHONE HYDROCHLORIDE 1 MG/ML
0.1 INJECTION, SOLUTION INTRAMUSCULAR; INTRAVENOUS; SUBCUTANEOUS
Status: DISCONTINUED | OUTPATIENT
Start: 2022-10-26 | End: 2022-10-26 | Stop reason: HOSPADM

## 2022-10-26 RX ORDER — SODIUM CHLORIDE, SODIUM LACTATE, POTASSIUM CHLORIDE, CALCIUM CHLORIDE 600; 310; 30; 20 MG/100ML; MG/100ML; MG/100ML; MG/100ML
INJECTION, SOLUTION INTRAVENOUS CONTINUOUS
Status: ACTIVE | OUTPATIENT
Start: 2022-10-26 | End: 2022-10-26

## 2022-10-26 RX ORDER — SODIUM CHLORIDE 9 MG/ML
INJECTION, SOLUTION INTRAMUSCULAR; INTRAVENOUS; SUBCUTANEOUS
Status: DISCONTINUED | OUTPATIENT
Start: 2022-10-26 | End: 2022-10-26 | Stop reason: HOSPADM

## 2022-10-26 RX ORDER — DIPHENHYDRAMINE HYDROCHLORIDE 50 MG/ML
12.5 INJECTION INTRAMUSCULAR; INTRAVENOUS
Status: DISCONTINUED | OUTPATIENT
Start: 2022-10-26 | End: 2022-10-26 | Stop reason: HOSPADM

## 2022-10-26 RX ORDER — MIDAZOLAM HYDROCHLORIDE 1 MG/ML
1 INJECTION INTRAMUSCULAR; INTRAVENOUS
Status: DISCONTINUED | OUTPATIENT
Start: 2022-10-26 | End: 2022-10-26 | Stop reason: HOSPADM

## 2022-10-26 RX ORDER — CELECOXIB 200 MG/1
200 CAPSULE ORAL 2 TIMES DAILY
Status: DISCONTINUED | OUTPATIENT
Start: 2022-10-26 | End: 2022-10-27 | Stop reason: HOSPADM

## 2022-10-26 RX ORDER — POLYETHYLENE GLYCOL 3350 17 G/17G
1 POWDER, FOR SOLUTION ORAL 2 TIMES DAILY PRN
Status: DISCONTINUED | OUTPATIENT
Start: 2022-10-26 | End: 2022-10-27 | Stop reason: HOSPADM

## 2022-10-26 RX ORDER — AMOXICILLIN 250 MG
1 CAPSULE ORAL NIGHTLY
Status: DISCONTINUED | OUTPATIENT
Start: 2022-10-26 | End: 2022-10-27 | Stop reason: HOSPADM

## 2022-10-26 RX ORDER — DIPHENHYDRAMINE HYDROCHLORIDE 50 MG/ML
25 INJECTION INTRAMUSCULAR; INTRAVENOUS EVERY 6 HOURS PRN
Status: DISCONTINUED | OUTPATIENT
Start: 2022-10-26 | End: 2022-10-27 | Stop reason: HOSPADM

## 2022-10-26 RX ORDER — KETOROLAC TROMETHAMINE 30 MG/ML
INJECTION, SOLUTION INTRAMUSCULAR; INTRAVENOUS
Status: DISCONTINUED | OUTPATIENT
Start: 2022-10-26 | End: 2022-10-26 | Stop reason: HOSPADM

## 2022-10-26 RX ORDER — SCOLOPAMINE TRANSDERMAL SYSTEM 1 MG/1
1 PATCH, EXTENDED RELEASE TRANSDERMAL
Status: DISCONTINUED | OUTPATIENT
Start: 2022-10-26 | End: 2022-10-27 | Stop reason: HOSPADM

## 2022-10-26 RX ORDER — ONDANSETRON 2 MG/ML
INJECTION INTRAMUSCULAR; INTRAVENOUS PRN
Status: DISCONTINUED | OUTPATIENT
Start: 2022-10-26 | End: 2022-10-27 | Stop reason: SURG

## 2022-10-26 RX ORDER — HYDROMORPHONE HYDROCHLORIDE 1 MG/ML
0.2 INJECTION, SOLUTION INTRAMUSCULAR; INTRAVENOUS; SUBCUTANEOUS
Status: DISCONTINUED | OUTPATIENT
Start: 2022-10-26 | End: 2022-10-26 | Stop reason: HOSPADM

## 2022-10-26 RX ORDER — HYDROMORPHONE HYDROCHLORIDE 2 MG/ML
INJECTION, SOLUTION INTRAMUSCULAR; INTRAVENOUS; SUBCUTANEOUS PRN
Status: DISCONTINUED | OUTPATIENT
Start: 2022-10-26 | End: 2022-10-27 | Stop reason: SURG

## 2022-10-26 RX ORDER — ACETAMINOPHEN 500 MG
1000 TABLET ORAL EVERY 6 HOURS PRN
Status: DISCONTINUED | OUTPATIENT
Start: 2022-10-31 | End: 2022-10-27 | Stop reason: HOSPADM

## 2022-10-26 RX ORDER — BISACODYL 10 MG
10 SUPPOSITORY, RECTAL RECTAL
Status: DISCONTINUED | OUTPATIENT
Start: 2022-10-26 | End: 2022-10-27 | Stop reason: HOSPADM

## 2022-10-26 RX ORDER — ONDANSETRON 2 MG/ML
4 INJECTION INTRAMUSCULAR; INTRAVENOUS EVERY 4 HOURS PRN
Status: DISCONTINUED | OUTPATIENT
Start: 2022-10-26 | End: 2022-10-27 | Stop reason: HOSPADM

## 2022-10-26 RX ORDER — HALOPERIDOL 5 MG/ML
1 INJECTION INTRAMUSCULAR EVERY 6 HOURS PRN
Status: DISCONTINUED | OUTPATIENT
Start: 2022-10-26 | End: 2022-10-27 | Stop reason: HOSPADM

## 2022-10-26 RX ORDER — DEXAMETHASONE SODIUM PHOSPHATE 4 MG/ML
4 INJECTION, SOLUTION INTRA-ARTICULAR; INTRALESIONAL; INTRAMUSCULAR; INTRAVENOUS; SOFT TISSUE
Status: DISCONTINUED | OUTPATIENT
Start: 2022-10-26 | End: 2022-10-27 | Stop reason: HOSPADM

## 2022-10-26 RX ORDER — EPINEPHRINE 1 MG/ML(1)
AMPUL (ML) INJECTION
Status: DISCONTINUED | OUTPATIENT
Start: 2022-10-26 | End: 2022-10-26 | Stop reason: HOSPADM

## 2022-10-26 RX ORDER — ROPIVACAINE HYDROCHLORIDE 5 MG/ML
INJECTION, SOLUTION EPIDURAL; INFILTRATION; PERINEURAL PRN
Status: DISCONTINUED | OUTPATIENT
Start: 2022-10-26 | End: 2022-10-27 | Stop reason: SURG

## 2022-10-26 RX ORDER — ENEMA 19; 7 G/133ML; G/133ML
1 ENEMA RECTAL
Status: DISCONTINUED | OUTPATIENT
Start: 2022-10-26 | End: 2022-10-27 | Stop reason: HOSPADM

## 2022-10-26 RX ORDER — DOCUSATE SODIUM 100 MG/1
100 CAPSULE, LIQUID FILLED ORAL 2 TIMES DAILY
Status: DISCONTINUED | OUTPATIENT
Start: 2022-10-26 | End: 2022-10-27 | Stop reason: HOSPADM

## 2022-10-26 RX ORDER — OXYCODONE HCL 5 MG/5 ML
5 SOLUTION, ORAL ORAL
Status: DISCONTINUED | OUTPATIENT
Start: 2022-10-26 | End: 2022-10-26 | Stop reason: HOSPADM

## 2022-10-26 RX ORDER — HALOPERIDOL 5 MG/ML
1 INJECTION INTRAMUSCULAR
Status: DISCONTINUED | OUTPATIENT
Start: 2022-10-26 | End: 2022-10-26 | Stop reason: HOSPADM

## 2022-10-26 RX ORDER — ONDANSETRON 2 MG/ML
4 INJECTION INTRAMUSCULAR; INTRAVENOUS
Status: COMPLETED | OUTPATIENT
Start: 2022-10-26 | End: 2022-10-26

## 2022-10-26 RX ORDER — AMOXICILLIN 250 MG
1 CAPSULE ORAL
Status: DISCONTINUED | OUTPATIENT
Start: 2022-10-26 | End: 2022-10-27 | Stop reason: HOSPADM

## 2022-10-26 RX ORDER — OXYCODONE HCL 5 MG/5 ML
10 SOLUTION, ORAL ORAL
Status: DISCONTINUED | OUTPATIENT
Start: 2022-10-26 | End: 2022-10-26 | Stop reason: HOSPADM

## 2022-10-26 RX ORDER — MEPERIDINE HYDROCHLORIDE 25 MG/ML
12.5 INJECTION INTRAMUSCULAR; INTRAVENOUS; SUBCUTANEOUS
Status: DISCONTINUED | OUTPATIENT
Start: 2022-10-26 | End: 2022-10-26 | Stop reason: HOSPADM

## 2022-10-26 RX ORDER — ACETAMINOPHEN 500 MG
1000 TABLET ORAL EVERY 6 HOURS
Status: DISCONTINUED | OUTPATIENT
Start: 2022-10-26 | End: 2022-10-27 | Stop reason: HOSPADM

## 2022-10-26 RX ORDER — SODIUM CHLORIDE, SODIUM LACTATE, POTASSIUM CHLORIDE, CALCIUM CHLORIDE 600; 310; 30; 20 MG/100ML; MG/100ML; MG/100ML; MG/100ML
INJECTION, SOLUTION INTRAVENOUS CONTINUOUS
Status: DISCONTINUED | OUTPATIENT
Start: 2022-10-26 | End: 2022-10-26 | Stop reason: HOSPADM

## 2022-10-26 RX ORDER — CELECOXIB 200 MG/1
200 CAPSULE ORAL 2 TIMES DAILY PRN
Status: DISCONTINUED | OUTPATIENT
Start: 2022-10-31 | End: 2022-10-27 | Stop reason: HOSPADM

## 2022-10-26 RX ADMIN — EPHEDRINE SULFATE 10 MG: 50 INJECTION, SOLUTION INTRAVENOUS at 17:38

## 2022-10-26 RX ADMIN — HYDROMORPHONE HYDROCHLORIDE 1 MG: 2 INJECTION INTRAMUSCULAR; INTRAVENOUS; SUBCUTANEOUS at 16:47

## 2022-10-26 RX ADMIN — DEXAMETHASONE SODIUM PHOSPHATE 7 MG: 4 INJECTION, SOLUTION INTRA-ARTICULAR; INTRALESIONAL; INTRAMUSCULAR; INTRAVENOUS; SOFT TISSUE at 16:22

## 2022-10-26 RX ADMIN — LIDOCAINE HYDROCHLORIDE 60 MG: 20 INJECTION, SOLUTION EPIDURAL; INFILTRATION; INTRACAUDAL at 16:15

## 2022-10-26 RX ADMIN — FENTANYL CITRATE 50 MCG: 50 INJECTION, SOLUTION INTRAMUSCULAR; INTRAVENOUS at 19:15

## 2022-10-26 RX ADMIN — ONDANSETRON 4 MG: 2 INJECTION INTRAMUSCULAR; INTRAVENOUS at 18:17

## 2022-10-26 RX ADMIN — PROPOFOL 170 MG: 10 INJECTION, EMULSION INTRAVENOUS at 16:15

## 2022-10-26 RX ADMIN — ROPIVACAINE HYDROCHLORIDE 30 ML: 5 INJECTION, SOLUTION EPIDURAL; INFILTRATION; PERINEURAL at 16:00

## 2022-10-26 RX ADMIN — FENTANYL CITRATE 25 MCG: 50 INJECTION, SOLUTION INTRAMUSCULAR; INTRAVENOUS at 19:00

## 2022-10-26 RX ADMIN — EPHEDRINE SULFATE 10 MG: 50 INJECTION, SOLUTION INTRAVENOUS at 17:05

## 2022-10-26 RX ADMIN — DOCUSATE SODIUM 100 MG: 100 CAPSULE, LIQUID FILLED ORAL at 21:07

## 2022-10-26 RX ADMIN — SODIUM CHLORIDE, POTASSIUM CHLORIDE, SODIUM LACTATE AND CALCIUM CHLORIDE: 600; 310; 30; 20 INJECTION, SOLUTION INTRAVENOUS at 13:32

## 2022-10-26 RX ADMIN — ACETAMINOPHEN 1000 MG: 500 TABLET ORAL at 21:06

## 2022-10-26 RX ADMIN — FENTANYL CITRATE 25 MCG: 50 INJECTION, SOLUTION INTRAMUSCULAR; INTRAVENOUS at 18:45

## 2022-10-26 RX ADMIN — CELECOXIB 200 MG: 200 CAPSULE ORAL at 21:07

## 2022-10-26 RX ADMIN — CLINDAMYCIN PHOSPHATE 900 MG: 150 INJECTION, SOLUTION INTRAMUSCULAR; INTRAVENOUS at 16:29

## 2022-10-26 RX ADMIN — MIDAZOLAM HYDROCHLORIDE 2 MG: 1 INJECTION, SOLUTION INTRAMUSCULAR; INTRAVENOUS at 15:48

## 2022-10-26 RX ADMIN — ONDANSETRON 4 MG: 2 INJECTION INTRAMUSCULAR; INTRAVENOUS at 17:34

## 2022-10-26 RX ADMIN — SENNOSIDES AND DOCUSATE SODIUM 1 TABLET: 50; 8.6 TABLET ORAL at 21:08

## 2022-10-26 RX ADMIN — DEXAMETHASONE SODIUM PHOSPHATE 1 MG: 4 INJECTION, SOLUTION INTRA-ARTICULAR; INTRALESIONAL; INTRAMUSCULAR; INTRAVENOUS; SOFT TISSUE at 16:00

## 2022-10-26 RX ADMIN — VANCOMYCIN HYDROCHLORIDE 1500 MG: 1 INJECTION, POWDER, LYOPHILIZED, FOR SOLUTION INTRAVENOUS at 20:53

## 2022-10-26 RX ADMIN — FENTANYL CITRATE 25 MCG: 50 INJECTION, SOLUTION INTRAMUSCULAR; INTRAVENOUS at 18:23

## 2022-10-26 RX ADMIN — TRANEXAMIC ACID 1000 MG: 100 INJECTION, SOLUTION INTRAVENOUS at 16:22

## 2022-10-26 RX ADMIN — FENTANYL CITRATE 100 MCG: 50 INJECTION, SOLUTION INTRAMUSCULAR; INTRAVENOUS at 16:12

## 2022-10-26 RX ADMIN — FENTANYL CITRATE 25 MCG: 50 INJECTION, SOLUTION INTRAMUSCULAR; INTRAVENOUS at 18:30

## 2022-10-26 RX ADMIN — EPHEDRINE SULFATE 10 MG: 50 INJECTION, SOLUTION INTRAVENOUS at 17:17

## 2022-10-26 ASSESSMENT — PAIN DESCRIPTION - PAIN TYPE
TYPE: ACUTE PAIN
TYPE: ACUTE PAIN

## 2022-10-26 NOTE — ANESTHESIA PREPROCEDURE EVALUATION
Case: [id number] Date/Time: 10/26/22 1445    Procedure: LEFT TOTAL KNEE ARTHROPLASTY    Pre-op diagnosis: OA KNEE LEFT SEVERE    Location: [facility] OR 05 / SURGERY [facility]    Surgeons: Ankush England M.D.          Relevant Problems   NEURO   (positive) History of CHF (congestive heart failure)       Physical Exam    Airway   Mallampati: II  TM distance: >3 FB  Neck ROM: full       Cardiovascular - normal exam  Rhythm: regular  Rate: normal  (-) murmur     Dental - normal exam           Pulmonary - normal exam  Breath sounds clear to auscultation     Abdominal    Neurological - normal exam                 Anesthesia Plan    ASA 3   ASA physical status 3 criteria: morbid obesity - BMI greater than or equal to 40    Plan - general and peripheral nerve block     Peripheral nerve block will be post-op pain control  Airway plan will be LMA          Induction: intravenous    Postoperative Plan: Postoperative administration of opioids is intended.    Pertinent diagnostic labs and testing reviewed    Informed Consent:    Anesthetic plan and risks discussed with patient.    Use of blood products discussed with: patient whom consented to blood products.

## 2022-10-26 NOTE — ANESTHESIA PROCEDURE NOTES
Peripheral Block    Date/Time: 10/26/2022 3:51 PM  Performed by: Chevy Pierce M.D.  Authorized by: Chevy Pierce M.D.     Patient Location:  Pre-op  Start Time:  10/26/2022 3:51 PM  End Time:  10/26/2022 4:00 PM  Reason for Block: at surgeon's request and post-op pain management ONLY    patient identified, IV checked, site marked, risks and benefits discussed, surgical consent, monitors and equipment checked, pre-op evaluation and timeout performed    Patient Position:  Supine  Prep: ChloraPrep    Monitoring:  Heart rate, continuous pulse ox and cardiac monitor  Block Region:  Lower Extremity  Lower Extremity - Block Type:  Selective FEMORAL nerve block at the Adductor Canal    Laterality:  Left  Procedures: ultrasound guided  Image captured, interpreted and electronically stored.  Local Infiltration:  Lidocaine  Strength:  1 %  Dose:  3 ml  Block Type:  Single-shot  Needle Length:  100mm  Needle Gauge:  21 G  Needle Localization:  Ultrasound guidance  Injection Assessment:  Negative aspiration for heme, no paresthesia on injection, incremental injection and local visualized surrounding nerve on ultrasound  Evidence of intravascular injection: No

## 2022-10-26 NOTE — ANESTHESIA PROCEDURE NOTES
Airway    Date/Time: 10/26/2022 4:16 PM  Performed by: Chevy Pierce M.D.  Authorized by: Chevy Pierce M.D.     Location:  OR  Urgency:  Elective  Difficult Airway: No    Indications for Airway Management:  Anesthesia      Spontaneous Ventilation: absent    Sedation Level:  Deep  Preoxygenated: Yes    Final Airway Type:  Supraglottic airway  Final Supraglottic Airway:  Standard LMA    SGA Size:  3  Number of Attempts at Approach:  1  Number of Other Approaches Attempted:  0

## 2022-10-27 ENCOUNTER — APPOINTMENT (OUTPATIENT)
Dept: RADIOLOGY | Facility: MEDICAL CENTER | Age: 57
End: 2022-10-27
Attending: STUDENT IN AN ORGANIZED HEALTH CARE EDUCATION/TRAINING PROGRAM
Payer: MEDICAID

## 2022-10-27 VITALS
SYSTOLIC BLOOD PRESSURE: 107 MMHG | HEART RATE: 109 BPM | OXYGEN SATURATION: 94 % | BODY MASS INDEX: 39.63 KG/M2 | WEIGHT: 237.88 LBS | TEMPERATURE: 98.1 F | RESPIRATION RATE: 18 BRPM | HEIGHT: 65 IN | DIASTOLIC BLOOD PRESSURE: 50 MMHG

## 2022-10-27 PROCEDURE — A9270 NON-COVERED ITEM OR SERVICE: HCPCS | Performed by: STUDENT IN AN ORGANIZED HEALTH CARE EDUCATION/TRAINING PROGRAM

## 2022-10-27 PROCEDURE — 700102 HCHG RX REV CODE 250 W/ 637 OVERRIDE(OP): Performed by: STUDENT IN AN ORGANIZED HEALTH CARE EDUCATION/TRAINING PROGRAM

## 2022-10-27 PROCEDURE — G0378 HOSPITAL OBSERVATION PER HR: HCPCS

## 2022-10-27 PROCEDURE — 700111 HCHG RX REV CODE 636 W/ 250 OVERRIDE (IP): Performed by: STUDENT IN AN ORGANIZED HEALTH CARE EDUCATION/TRAINING PROGRAM

## 2022-10-27 PROCEDURE — 96375 TX/PRO/DX INJ NEW DRUG ADDON: CPT

## 2022-10-27 PROCEDURE — 93971 EXTREMITY STUDY: CPT | Mod: LT

## 2022-10-27 PROCEDURE — 97163 PT EVAL HIGH COMPLEX 45 MIN: CPT

## 2022-10-27 PROCEDURE — 97165 OT EVAL LOW COMPLEX 30 MIN: CPT

## 2022-10-27 RX ORDER — OXYCODONE HYDROCHLORIDE 10 MG/1
10 TABLET ORAL
Status: DISCONTINUED | OUTPATIENT
Start: 2022-10-27 | End: 2022-10-27 | Stop reason: HOSPADM

## 2022-10-27 RX ADMIN — ONDANSETRON 4 MG: 2 INJECTION INTRAMUSCULAR; INTRAVENOUS at 03:28

## 2022-10-27 RX ADMIN — OXYCODONE HYDROCHLORIDE 10 MG: 10 TABLET ORAL at 09:25

## 2022-10-27 RX ADMIN — ACETAMINOPHEN 1000 MG: 500 TABLET ORAL at 05:20

## 2022-10-27 RX ADMIN — ASPIRIN 81 MG: 81 TABLET, COATED ORAL at 12:30

## 2022-10-27 RX ADMIN — OXYCODONE HYDROCHLORIDE 10 MG: 10 TABLET ORAL at 14:41

## 2022-10-27 RX ADMIN — ACETAMINOPHEN 1000 MG: 500 TABLET ORAL at 12:30

## 2022-10-27 RX ADMIN — ASPIRIN 81 MG: 81 TABLET, COATED ORAL at 00:08

## 2022-10-27 RX ADMIN — CELECOXIB 200 MG: 200 CAPSULE ORAL at 05:20

## 2022-10-27 RX ADMIN — DOCUSATE SODIUM 100 MG: 100 CAPSULE, LIQUID FILLED ORAL at 05:20

## 2022-10-27 RX ADMIN — ACETAMINOPHEN 1000 MG: 500 TABLET ORAL at 00:08

## 2022-10-27 ASSESSMENT — COGNITIVE AND FUNCTIONAL STATUS - GENERAL
SUGGESTED CMS G CODE MODIFIER MOBILITY: CK
SUGGESTED CMS G CODE MODIFIER DAILY ACTIVITY: CI
HELP NEEDED FOR BATHING: A LITTLE
STANDING UP FROM CHAIR USING ARMS: A LITTLE
WALKING IN HOSPITAL ROOM: A LITTLE
MOBILITY SCORE: 19
CLIMB 3 TO 5 STEPS WITH RAILING: A LOT
MOVING FROM LYING ON BACK TO SITTING ON SIDE OF FLAT BED: A LITTLE
DAILY ACTIVITIY SCORE: 23

## 2022-10-27 ASSESSMENT — GAIT ASSESSMENTS
DEVIATION: ANTALGIC;DECREASED HEEL STRIKE
ASSISTIVE DEVICE: FRONT WHEEL WALKER
GAIT LEVEL OF ASSIST: CONTACT GUARD ASSIST
DISTANCE (FEET): 30

## 2022-10-27 ASSESSMENT — PAIN DESCRIPTION - PAIN TYPE
TYPE: SURGICAL PAIN

## 2022-10-27 ASSESSMENT — PAIN SCALES - GENERAL: PAIN_LEVEL: 3

## 2022-10-27 ASSESSMENT — PATIENT HEALTH QUESTIONNAIRE - PHQ9
2. FEELING DOWN, DEPRESSED, IRRITABLE, OR HOPELESS: NOT AT ALL
1. LITTLE INTEREST OR PLEASURE IN DOING THINGS: NOT AT ALL
SUM OF ALL RESPONSES TO PHQ9 QUESTIONS 1 AND 2: 0

## 2022-10-27 ASSESSMENT — ACTIVITIES OF DAILY LIVING (ADL): TOILETING: INDEPENDENT

## 2022-10-27 NOTE — OR SURGEON
Immediate Post OP Note    PreOp Diagnosis: left knee osteoarthritis      PostOp Diagnosis: same      Procedure(s):  LEFT TOTAL KNEE ARTHROPLASTY - Wound Class: Clean    Surgeon(s):  Ankush England M.D.    Anesthesiologist/Type of Anesthesia:  Anesthesiologist: Chevy Pierce M.D./General    Surgical Staff:  Circulator: Liv Patricio R.N.  Limb Peacock: Bert Marshall  Scrub Person: Bonilla Rodriguez  First Assist: Du Ewing    Specimens removed if any:  * No specimens in log *    Estimated Blood Loss: 200cc    Findings: OA    Complications: none        10/26/2022 5:46 PM Ankush England M.D.

## 2022-10-27 NOTE — DISCHARGE INSTRUCTIONS
Discharge Instructions    Discharged to home by car with relative. Discharged via wheelchair, hospital escort: Yes.  Special equipment needed: Walker    Be sure to schedule a follow-up appointment with your primary care doctor or any specialists as instructed.     Discharge Plan:   Diet Plan: Discussed  Activity Level: Discussed  Confirmed Follow up Appointment: Patient to Call and Schedule Appointment  Confirmed Symptoms Management: Discussed  Medication Reconciliation Updated: Yes  Influenza Vaccine Indication: Patient Refuses    I understand that a diet low in cholesterol, fat, and sodium is recommended for good health. Unless I have been given specific instructions below for another diet, I accept this instruction as my diet prescription.   Other diet: normal    Special Instructions: Discharge instructions for the Orthopedic Patient    Follow up with Primary Care Physician within 2 weeks of discharge to home, regarding:  Review of medications and diagnostic testing.  Surveillance for medical complications.  Workup and treatment of osteoporosis, if appropriate.     -Is this a Hip/Knee/Shoulder Joint Replacement patient? Yes   TOTAL KNEE REPLACEMENT, AFTER-CARE GUIDELINES     These instructions provide you with information on caring for yourself and your knee after surgery. Your health care provider may also give you instructions that are more specific. Your treatment was planned and performed according to current medical practices but problems sometimes occur. Call your health care provider if you have any problems or questions.     WHAT TO EXPECT AFTER THE PROCEDURE   After your procedure, your knee will typically be stiff, sore, and bruised. This will improve over time.     Pain   Follow your home pain management plan as discussed with your nurse and as directed by your provider.   It is important to follow any scheduled pain medications for maximal pain relief.   If prescribed opioid medication, the goal is to  use opioids only as needed and to wean off prescription pain medicine as soon as possible.   Ice can be used for pain control.   Put ice in a plastic bag.   Place a towel between your skin and the bag.   Leave the ice on for 20 minutes, 2-3 times a day at a minimum.   Most patients are off the pain pills by 3 weeks. If your pain continues to be severe, follow up with your provider.     Infection   Knee joint infections occur in fewer than 2% of patients. The most common causes of infection following total knee replacement surgery are from bacteria that enter the bloodstream during dental procedures, urinary tract infections, or skin infections. These bacteria can lodge around your knee replacement and cause an infection.   Keep the incision as clean and dry as possible.   Always wash your hands before touching your incision.   Avoid dental care for 3 months after surgery. Your provider may recommend taking a dose of antibiotics an hour prior to any dental procedure. After 2 years, most providers recommend antibiotics only before an extensive procedure. Ask your provider what they recommend.   Signs and symptoms of infection include low-grade fever, redness, pain, swelling and drainage from your incision. Notify your provider IMMEDIATELY if you develop ANY of these symptoms.     Post op Disturbances   Bowel Habits - Constipation is extremely common and caused by a combination of anesthesia, lack of mobility, dehydration and pain medicine. Use stool softeners or laxatives if necessary. It is important not to ignore this problem as bowel obstructions can be a serious complication after joint replacement surgery.   Mood/Energy Level - Many patients experience a lack of energy and endurance for up to 2-3 months after surgery. Some people feel down and can even become depressed. This is likely due to postoperative anemia, change in activity level, lack of sleep, pain medicine and just the emotional reaction to the surgery  itself that is a big disruption in a person’s life. This usually passes. If symptoms persist, follow up with your primary care provider.  Returning to Work - Your provider will give you specific instructions based on your profession. Generally, if you work a sedentary job requiring little standing or walking, most patients may return within 2-6 weeks. Manual labor jobs involving walking, lifting and standing may take 3-4 months. Your provider’s office can provide a release to part-time or light duty work early on in your recovery and progress you to full duty as able.   Driving - You can begin driving once cleared by your provider, provided you are no longer taking narcotic pain medication or any other medications that impair driving. Discuss the length of time expected with your provider as returning to driving depends on things such as your vehicle, which knee was replaced (right or left), and knee motion, strength and reflexes returning appropriately.   Avoiding falls - A fall during the first few weeks after surgery can damage your new knee and may result in a need for further surgery.  throw rugs and tack down loose carpeting. Be aware of floor hazards such as pets, small objects or uneven surfaces. Notify your provider of any falls.   Airport Metal Detectors - The sensitivity of metal detectors varies and it is likely that your prosthesis will cause an alarm. Inform the  of your artificial joint.     Diet   Resume your normal diet as tolerated.   It is important to achieve a healthy nutritional status by eating a well-balanced diet on a regular basis.   Your provider may recommend that you take iron and vitamin supplements.   Continue to drink plenty of fluids.     Shower/Bathing   You may shower as soon as you get home from the hospital unless otherwise instructed.   Keep your incision out of water to prevent infection. To keep the incision dry when showering, cover it with a plastic  bag or plastic wrap. If your bandage is waterproof, this may not be necessary. o Pat incision dry if it gets wet. Do not rub. Notify your provider.   Do not submerge in a bath until cleared by your provider. Your staples must be out and the incision completely healed.     Dressing Change: Only change your dressing if directed by your provider.   Wash hands.   Open all dressing change materials.   Remove old dressing and discard.   Inspect incision for signs of irritation or infection including redness, increase in clear drainage, yellow/green drainage, odor and surrounding skin hot to touch. Notify your provider if present.    the new dressing by one corner and lay over the incision. Be careful not to touch the inside of the dressing that will lay over the incision.   Secure in place as instructed. Swelling/Bruising   Swelling is normal after knee replacement and can involve the thigh, knee, calf and foot.   Swelling can last from 3-6 months.   To reduce swelling, elevate your leg higher than your heart while reclining. The first week you are home you should elevate your leg an equal amount of time as you are active.   The swelling is usually worse after you go home since you are upright for longer periods of time.   Bruising often does not appear until after you arrive home and can be quite dramatic- appearing purple, black, or green. Bruising is typically not concerning and will subside without any treatment.     Blood Clot Prevention   Your treatment plan includes multiple preventative measures to decrease the risk of blood clots in the legs (DVTs) and the less common, but serious, clots that travel to the lungs (pulmonary emboli). Most patients are at standard risk for them, but people who are at higher risk include those who have had previous clots, a family history of clotting, smoking, diabetes, obesity, advanced age, use estrogen and/or live a sedentary lifestyle.     Signs of blood clots in legs  include - Swelling in thigh, calf or ankle that does not go down with elevation. Pain, heat and tenderness in calf, back of calf or groin area. NOTE: blood clots can occur in either leg.   Signs of blood clots in lungs include - Sudden increased shortness of breath, sudden onset of chest pain, and localized chest pain with coughing.   If you experience any of the above symptoms, notify your provider and seek medical attention immediately.   You received anticoagulant therapy (blood thinners) in the hospital. Continue the prescribed blood-thinning medication at home, as directed by your provider.   Your risk for developing a clot continues for up to 2-3 months after surgery. Avoid prolonged sitting and dehydration (long air trips and car trips). If you do take a trip during this time, please get up, move around every 1-1.5 hours, and discuss all travel plans with your provider.     Activity   Once home, stay active. The key is not to overdo it. While you can expect some good days and some bad days, you should notice a gradual improvement and a gradual increase in your endurance over the next 6 to 12 months. Exercise is a critical component of recovery, particularly during the first few weeks after surgery.     Normal activities of daily living - Expect to resume most within 3 to 6 weeks following surgery. Some pain with activity and at night is common for several weeks after surgery. Walk as much as you like once your doctor gives permission to proceed, but remember that walking is no substitute for the exercises your doctor and physical therapist prescribe. Use a walker, crutches or cane to assist with walking until you can walk smoothly (minimal or no limp) without assistance.   Physical Therapy Exercises - Follow your home exercise program as instructed by your physical therapist during your hospital stay. Call and set up outpatient physical therapy appointments per your provider’s recommendations. Physical  therapy after the hospital stay focuses on increasing your range of motion, strengthening your muscles and improving your gait/walking pattern. Contact your provider for the referral to outpatient physical therapy if you have not yet received this. ?   Riding a stationary bicycle can help maintain muscle tone and keep your knee flexible. Begin stationary bicycling as directed by your physical therapist or provider.   Sexual Activity - Your provider can tell you when it safe to resume sexual activity.   Sleeping Positions - You can safely sleep on your back, on either side, or on your stomach.   Other Activities - Lower impact activities are preferred. Consult your provider if you have specific questions.     When to Call the Doctor   Call the provider if you experience:   Fever over 100.5° F   Increased pain, drainage, redness, odor or heat around the incision area   Shaking chills   Increased knee pain with activity and rest   Increased pain in calf, tenderness or redness above or below the knee   Increased swelling of calf, ankle, foot   Sudden increased shortness of breath, sudden onset of chest pain, localized chest pain with coughing   Incision opening   Or, if there are any questions or concerns about medications or care.     Infection statistic resource:   [email or web address]     -Is this patient being discharged with medication to prevent blood clots?  Yes, Aspirin   Aspirin, ASA oral tablets  What is this medicine?  ASPIRIN (AS pir in) is a pain reliever. It is used to treat mild pain and fever. This medicine is also used as directed by a doctor to prevent and to treat heart attacks, to prevent strokes and blood clots, and to treat arthritis or inflammation.  This medicine may be used for other purposes; ask your health care provider or pharmacist if you have questions.  COMMON BRAND NAME(S): Aspir-Low,  Aspir-Frances, Aspirtab, Ferny Advanced Aspirin, Ferny Aspirin, Ferny Aspirin Extra Strength, Ferny Aspirin Plus, Ferny Extra Strength, Ferny Extra Strength Plus, Ferny Genuine Aspirin, Ferny Womens Aspirin, Bufferin, Bufferin Extra Strength, Bufferin Low Dose  What should I tell my health care provider before I take this medicine?  They need to know if you have any of these conditions:  anemia  asthma  bleeding problems  child with chickenpox, the flu, or other viral infection  diabetes  gout  if you frequently drink alcohol containing drinks  kidney disease  liver disease  low level of vitamin K  lupus  smoke tobacco  stomach ulcers or other problems  an unusual or allergic reaction to aspirin, tartrazine dye, other medicines, dyes, or preservatives  pregnant or trying to get pregnant  breast-feeding  How should I use this medicine?  Take this medicine by mouth with a glass of water. Follow the directions on the package or prescription label. You can take this medicine with or without food. If it upsets your stomach, take it with food. Do not take your medicine more often than directed.  Talk to your pediatrician regarding the use of this medicine in children. While this drug may be prescribed for children as young as 12 years of age for selected conditions, precautions do apply. Children and teenagers should not use this medicine to treat chicken pox or flu symptoms unless directed by a doctor.  Patients over 65 years old may have a stronger reaction and need a smaller dose.  Overdosage: If you think you have taken too much of this medicine contact a poison control center or emergency room at once.  NOTE: This medicine is only for you. Do not share this medicine with others.  What if I miss a dose?  If you are taking this medicine on a regular schedule and miss a dose, take it as soon as you can. If it is almost time for your next dose, take only that dose. Do not take double or extra doses.  What may interact with  this medicine?  Do not take this medicine with any of the following medications:  cidofovir  ketorolac  probenecid  This medicine may also interact with the following medications:  alcohol  alendronate  bismuth subsalicylate  flavocoxid  herbal supplements like feverfew, garlic, abe, ginkgo biloba, horse chestnut  medicines for diabetes or glaucoma like acetazolamide, methazolamide  medicines for gout  medicines that treat or prevent blood clots like enoxaparin, heparin, ticlopidine, warfarin  other aspirin and aspirin-like medicines  NSAIDs, medicines for pain and inflammation, like ibuprofen or naproxen  pemetrexed  sulfinpyrazone  varicella live vaccine  This list may not describe all possible interactions. Give your health care provider a list of all the medicines, herbs, non-prescription drugs, or dietary supplements you use. Also tell them if you smoke, drink alcohol, or use illegal drugs. Some items may interact with your medicine.  What should I watch for while using this medicine?  If you are treating yourself for pain, tell your doctor or health care professional if the pain lasts more than 10 days, if it gets worse, or if there is a new or different kind of pain. Tell your doctor if you see redness or swelling. Also, check with your doctor if you have a fever that lasts for more than 3 days. Only take this medicine to prevent heart attacks or blood clotting if prescribed by your doctor or health care professional.  Do not take aspirin or aspirin-like medicines with this medicine. Too much aspirin can be dangerous. Always read the labels carefully.  This medicine can irritate your stomach or cause bleeding problems. Do not smoke cigarettes or drink alcohol while taking this medicine. Do not lie down for 30 minutes after taking this medicine to prevent irritation to your throat.  If you are scheduled for any medical or dental procedure, tell your healthcare provider that you are taking this medicine. You  may need to stop taking this medicine before the procedure.  This medicine may be used to treat migraines. If you take migraine medicines for 10 or more days a month, your migraines may get worse. Keep a diary of headache days and medicine use. Contact your healthcare professional if your migraine attacks occur more frequently.  What side effects may I notice from receiving this medicine?  Side effects that you should report to your doctor or health care professional as soon as possible:  allergic reactions like skin rash, itching or hives, swelling of the face, lips, or tongue  breathing problems  changes in hearing, ringing in the ears  confusion  general ill feeling or flu-like symptoms  pain on swallowing  redness, blistering, peeling or loosening of the skin, including inside the mouth or nose  signs and symptoms of bleeding such as bloody or black, tarry stools; red or dark-brown urine; spitting up blood or brown material that looks like coffee grounds; red spots on the skin; unusual bruising or bleeding from the eye, gums, or nose  trouble passing urine or change in the amount of urine  unusually weak or tired  yellowing of the eyes or skin  Side effects that usually do not require medical attention (report to your doctor or health care professional if they continue or are bothersome):  diarrhea or constipation  headache  nausea, vomiting  stomach gas, heartburn  This list may not describe all possible side effects. Call your doctor for medical advice about side effects. You may report side effects to FDA at 1-620-FDA-8882.  Where should I keep my medicine?  Keep out of the reach of children.  Store at room temperature between 15 and 30 degrees C (59 and 86 degrees F). Protect from heat and moisture. Do not use this medicine if it has a strong vinegar smell. Throw away any unused medicine after the expiration date.  NOTE: This sheet is a summary. It may not cover all possible information. If you have  questions about this medicine, talk to your doctor, pharmacist, or health care provider.  © 2020 [employer] (2018-01-30 10:42:13)    -Is this patient being discharged with medication to prevent blood clots?  Yes, Aspirin   Aspirin, ASA oral tablets  What is this medicine?  ASPIRIN (AS pir in) is a pain reliever. It is used to treat mild pain and fever. This medicine is also used as directed by a doctor to prevent and to treat heart attacks, to prevent strokes and blood clots, and to treat arthritis or inflammation.  This medicine may be used for other purposes; ask your health care provider or pharmacist if you have questions.  COMMON BRAND NAME(S): Aspir-Low, Aspir-Frances, Aspirtab, Ferny Advanced Aspirin, Ferny Aspirin, Ferny Aspirin Extra Strength, Ferny Aspirin Plus, Ferny Extra Strength, Ferny Extra Strength Plus, Ferny Genuine Aspirin, Ferny Womens Aspirin, Bufferin, Bufferin Extra Strength, Bufferin Low Dose  What should I tell my health care provider before I take this medicine?  They need to know if you have any of these conditions:  anemia  asthma  bleeding problems  child with chickenpox, the flu, or other viral infection  diabetes  gout  if you frequently drink alcohol containing drinks  kidney disease  liver disease  low level of vitamin K  lupus  smoke tobacco  stomach ulcers or other problems  an unusual or allergic reaction to aspirin, tartrazine dye, other medicines, dyes, or preservatives  pregnant or trying to get pregnant  breast-feeding  How should I use this medicine?  Take this medicine by mouth with a glass of water. Follow the directions on the package or prescription label. You can take this medicine with or without food. If it upsets your stomach, take it with food. Do not take your medicine more often than directed.  Talk to your pediatrician regarding the use of this medicine in children. While this drug may be prescribed for children as young as 12 years of age for selected  conditions, precautions do apply. Children and teenagers should not use this medicine to treat chicken pox or flu symptoms unless directed by a doctor.  Patients over 65 years old may have a stronger reaction and need a smaller dose.  Overdosage: If you think you have taken too much of this medicine contact a poison control center or emergency room at once.  NOTE: This medicine is only for you. Do not share this medicine with others.  What if I miss a dose?  If you are taking this medicine on a regular schedule and miss a dose, take it as soon as you can. If it is almost time for your next dose, take only that dose. Do not take double or extra doses.  What may interact with this medicine?  Do not take this medicine with any of the following medications:  cidofovir  ketorolac  probenecid  This medicine may also interact with the following medications:  alcohol  alendronate  bismuth subsalicylate  flavocoxid  herbal supplements like feverfew, garlic, abe, ginkgo biloba, horse chestnut  medicines for diabetes or glaucoma like acetazolamide, methazolamide  medicines for gout  medicines that treat or prevent blood clots like enoxaparin, heparin, ticlopidine, warfarin  other aspirin and aspirin-like medicines  NSAIDs, medicines for pain and inflammation, like ibuprofen or naproxen  pemetrexed  sulfinpyrazone  varicella live vaccine  This list may not describe all possible interactions. Give your health care provider a list of all the medicines, herbs, non-prescription drugs, or dietary supplements you use. Also tell them if you smoke, drink alcohol, or use illegal drugs. Some items may interact with your medicine.  What should I watch for while using this medicine?  If you are treating yourself for pain, tell your doctor or health care professional if the pain lasts more than 10 days, if it gets worse, or if there is a new or different kind of pain. Tell your doctor if you see redness or swelling. Also, check with your  doctor if you have a fever that lasts for more than 3 days. Only take this medicine to prevent heart attacks or blood clotting if prescribed by your doctor or health care professional.  Do not take aspirin or aspirin-like medicines with this medicine. Too much aspirin can be dangerous. Always read the labels carefully.  This medicine can irritate your stomach or cause bleeding problems. Do not smoke cigarettes or drink alcohol while taking this medicine. Do not lie down for 30 minutes after taking this medicine to prevent irritation to your throat.  If you are scheduled for any medical or dental procedure, tell your healthcare provider that you are taking this medicine. You may need to stop taking this medicine before the procedure.  This medicine may be used to treat migraines. If you take migraine medicines for 10 or more days a month, your migraines may get worse. Keep a diary of headache days and medicine use. Contact your healthcare professional if your migraine attacks occur more frequently.  What side effects may I notice from receiving this medicine?  Side effects that you should report to your doctor or health care professional as soon as possible:  allergic reactions like skin rash, itching or hives, swelling of the face, lips, or tongue  breathing problems  changes in hearing, ringing in the ears  confusion  general ill feeling or flu-like symptoms  pain on swallowing  redness, blistering, peeling or loosening of the skin, including inside the mouth or nose  signs and symptoms of bleeding such as bloody or black, tarry stools; red or dark-brown urine; spitting up blood or brown material that looks like coffee grounds; red spots on the skin; unusual bruising or bleeding from the eye, gums, or nose  trouble passing urine or change in the amount of urine  unusually weak or tired  yellowing of the eyes or skin  Side effects that usually do not require medical attention (report to your doctor or health care  professional if they continue or are bothersome):  diarrhea or constipation  headache  nausea, vomiting  stomach gas, heartburn  This list may not describe all possible side effects. Call your doctor for medical advice about side effects. You may report side effects to FDA at 2-519-LYL-5702.  Where should I keep my medicine?  Keep out of the reach of children.  Store at room temperature between 15 and 30 degrees C (59 and 86 degrees F). Protect from heat and moisture. Do not use this medicine if it has a strong vinegar smell. Throw away any unused medicine after the expiration date.  NOTE: This sheet is a summary. It may not cover all possible information. If you have questions about this medicine, talk to your doctor, pharmacist, or health care provider.  © 2020 Elsevier/Gold Standard (2018-01-30 10:42:13)    Is patient discharged on Warfarin / Coumadin?   No

## 2022-10-27 NOTE — DISCHARGE PLANNING
Received Choice form at 5129  Agency/Facility Name: [facility]  Referral sent per Choice form @ 1634

## 2022-10-27 NOTE — OP REPORT
Preop Diagnosis: Advanced left knee arthritis  Postop Diagnosis:  Same  Procedure: Left total knee arthroplasty  Surgeon: Dr. Samanta MD  Assistant: Du ARMIJO  Anesthesia: . General + Adductor canal block  Estimated Blood Loss: 200cc  Drains: None  Complications: None    Implants: Roopa Person[employer] MC Cemented, size 6 femur, size D tibia, with a 10 mm insert and 29 oval patella.    Indications: Yessica Fry is a 57 y.o. female who has had severe progressive knee pain that failed conservative management.  There were severe degenerative changes on radiographs.  We discussed the options and she was ultimately indicated for knee replacement.  We discussed the risk of bleeding, transfusion, pain, neurovascular injury, stiffness, fracture, infection, wound complication, loosening of the parts over time, blood clots, and medical complication.  No guarantees were made and she elected to proceed.    Pertinent Findings and Releases: There were severe degenerative changes.  At the end of the case, the knee easily came to full extension and flexed up to calf/thigh impingement with the arthrotomy closed.  The patella tracked centrally.    Informed consent and site marking were confirmed in preop.  An adductor canal block had been performed by the anesthesiologist, and then she was brought back to the OR where anesthesia was performed. Supine positioning was perfmored with all bony prominences well padded with a padded tourniquet on the thigh.  The extremity was prepped and draped in the usual sterile fashion. I performed a pre-procedure timeout to confirm we had the correct patient, side, site, procedure, and presence of necessary personal and equipment.  I confirmed that Clindamycin and tranexamic acid were given.    A midline incision was made medial to the tibial tubercle centered over patella taken down to the deep capsule of the knee. A medial parapatellar arthrotomy was performed.  The fat pad was  released. The patella was everted and cut to a smooth surface and subluxated and the knee was flexed. The remnants of the anterior horn of the medial and lateral meniscus were removed as well as the ACL and PCL from the intercondylar notch. All distal protruding osteophytes were removed. I then drilled and accessed the intramedullary canal of the femur, lavaged it and placed the intramedullary cutting guide. The distal femur was cut . The cut was verified and attention was then turned to the tibia. The tibia was subluxed forward. It was cut using external medullary alignment perpendicular to the mechanical axis. The knee was then brought into full extension and the extension gap was assessed.  Sequential releases were performed in extension to form a rectangular space, confirmed using a spacer block. The collaterals were intact and overall limb alignment was appropriate.  The knee was then flexed to 90 degrees.  The femur was sized.  The cutting block was then pinned in place.  I checked to ensure that rotation looked appropriate based on Harts's line and transepicondylar line.  An virgil wing was used to make sure I wouldn't notch anteriorly.  Anterior, posterior, chamfer, and box cuts for the femur were then made. The knee was then again tensed at 90 degrees and the meniscal remnants and posterior osteophytes were removed.  The posterior capsule was injected with a local anesthetic cocktail.  The tibia was then subluxed forward, sized, and punched in the appropriate amount of external rotation and mechanical alignment was verified using a drop fawad. Trials were placed, the knee was reduced with a trial insert, it was taken through a range of motion, and found to be stable in the varus/valgus plane 0-30 degrees of flexion and the AP plane at 90 degrees of flexion. Attention was then turned to the patella. A symmetric resection was performed to recreate native patella height and appropriately sized. All extraneous  osteophyte and synovium were removed.  With a trial in place, the patella tracked centrally using the no thumbs technique. All trial components were removed. The real components were opened on the back table. The bone ends were copiously lavaged and dried. Two packs of cement were mixed and the real components were cemented into place. The knee was reduced with a trial insert in place and the cement was allowed to cure.    Once the cement cured, all extraneous cement was removed from around the knee taking particular care to remove extraneous cement from the posterior aspect of the knee. The real insert was placed. Stability and patellar tracking were excellent. The knee was then copiously irrigated. One gram of Vancomycin powder was left in the wound.  The arthrotomy was closed with number 2 running barbed suture, and the wound was closed in layers.  An occlusive silver dressing was applied and the patient was turned over to anesthesia in stable condition without any apparent intraoperative complications.    Plan:  WBAT, PT/OT evaluation,  Aspirin 81mg BID for 4 weeks for DVT prophylaxis, Leave dressing in place until followup.

## 2022-10-27 NOTE — PROGRESS NOTES
Orthopedic progress note    CC: POD #1 s/p L TKA    S: No pain. Doing well. No issues. Ready to go home    O:  Dressing c/d/I  Fires EHL/FHL/TA/GS  SILT FDWS/M/L/D/P aspects of foot however global decrease in sensation over entire foot   2+dp pulse    A/P: 56 yo F s/p the above doing well    WBAT  DC home today  ASA 81mg BID x 4 weeks for DVT ppx  Follow up at scheduled visit

## 2022-10-27 NOTE — PROGRESS NOTES
Received patient from Night shift RN . Patient is awake and alert.No sign of distress. Patient denies any n/v as of the moment. Patient complain of Pain 8/10 to Left knee,cold pack applied. Moderate dorsiflexion and plantarflexion on the left foot, positive pulse noted, slightly swelling on the left leg observed.. Fall precaution in placed, kept bed in lowest position and call light within reach.    1100 tried to contact office of MD England but no one is answering,tried many times,  is busy    1228 Called OR, left a message to ammon for MD England, MD England has ongoing surgery    1342,MD England made aware left calf/leg is swollen, ordered Ultrasound left leg Stat    1540 Left Leg ultrasound result in, informed to MD England

## 2022-10-27 NOTE — THERAPY
Occupational Therapy   Initial Evaluation     Patient Name: Yessica Fry  Age:  57 y.o., Sex:  female  Medical Record #: [id number]  Today's Date: 10/27/2022     Precautions  Precautions: (P) Weight Bearing As Tolerated Left Lower Extremity    Assessment  Patient is 57 y.o. female s/p L TKA. A&Ox4, motivated for home. Earlier, with L knee/calf pain (since improved) and difficulty with ambulation. Pt demonstrates Sup/SBA with ADL transfers, ambulation with FWW. Toileting with Sup, FB dressing with SBA. Shows good safety. UIC post session. Lives with fiancee and 28 y/o son. DC ready from OT.            Plan  Recommend Occupational Therapy for Evaluation only   .    DC Equipment Recommendations: (P) Front-Wheel Walker  Discharge Recommendations: (P) Anticipate that the patient will have no further occupational therapy needs after discharge from the hospital     Subjective  Pleasant and cooperative     Objective     10/27/22 1637   Prior Living Situation   Prior Services None   Housing / Facility 1 Story Apartment / Condo   Bathroom Set up Walk In Shower;Shower Chair   Equipment Owned Tub / Shower Seat   Lives with - Patient's Self Care Capacity Significant Other   Prior Level of ADL Function   Self Feeding Independent   Grooming / Hygiene Independent   Bathing Independent   Dressing Independent   Toileting Independent   Prior Level of IADL Function   Medication Management Independent   Laundry Independent   Kitchen Mobility Independent   Finances Independent   Home Management Independent   Shopping Independent   Prior Level Of Mobility Independent Without Device in Home   Driving / Transportation Driving Independent   Occupation (Pre-Hospital Vocational) Unable To Determine At This Time   Leisure Interests Hobbies   History of Falls   History of Falls No   Precautions   Precautions Weight Bearing As Tolerated Left Lower Extremity   Vitals   O2 Delivery Device None - Room Air   Pain 0 - 10 Group   Location  Knee   Location Orientation Left   Therapist Pain Assessment Post Activity Pain Same as Prior to Activity;Nurse Notified   Cognition    Cognition / Consciousness WDL   Level of Consciousness Alert   Passive ROM Upper Body   Passive ROM Upper Body WDL   Active ROM Upper Body   Active ROM Upper Body  WDL   Sensation Upper Body   Upper Extremity Sensation  WDL   Upper Body Muscle Tone   Upper Body Muscle Tone  WDL   Coordination Upper Body   Coordination WDL   Balance Assessment   Sitting Balance (Static) Good   Sitting Balance (Dynamic) Good   Standing Balance (Static) Fair +   Standing Balance (Dynamic) Fair   Weight Shift Sitting Good   Weight Shift Standing Fair   Comments fww   Bed Mobility    Supine to Sit Modified Independent   Sit to Supine Modified Independent   ADL Assessment   Eating Independent   Grooming Independent   Upper Body Dressing Independent   Lower Body Dressing Contact Guard Assist   Toileting Supervision   How much help from another person does the patient currently need...   Putting on and taking off regular lower body clothing? 4   Bathing (including washing, rinsing, and drying)? 3   Toileting, which includes using a toilet, bedpan, or urinal? 4   Putting on and taking off regular upper body clothing? 4   Taking care of personal grooming such as brushing teeth? 4   Eating meals? 4   6 Clicks Daily Activity Score 23   Functional Mobility   Sit to Stand Standby Assist   Bed, Chair, Wheelchair Transfer Standby Assist   Toilet Transfers Standby Assist   Transfer Method Stand Step   Visual Perception   Visual Perception  WDL   Activity Tolerance   Sitting in Chair >30   Sitting Edge of Bed 12   Standing 7   Education Group   Education Provided Home Safety   Role of Occupational Therapist Patient Response Patient;Significant Other;Acceptance;Explanation;Verbal Demonstration   Home Safety Patient Response Patient;Significant Other;Acceptance;Explanation;Verbal Demonstration   Anticipated Discharge  Equipment and Recommendations   DC Equipment Recommendations Front-Wheel Walker   Discharge Recommendations Anticipate that the patient will have no further occupational therapy needs after discharge from the hospital   Interdisciplinary Plan of Care Collaboration   IDT Collaboration with  Nursing;Family / Caregiver   Patient Position at End of Therapy Seated;Family / Friend in Room;Tray Table within Reach;Call Light within Reach   Collaboration Comments Results. Per Rn, pt wanting to dc home today. Pt with less pain, LLE US neg for dvt.

## 2022-10-27 NOTE — DISCHARGE PLANNING
Case Management Discharge Planning    Admission Date: 10/26/2022  GMLOS:    ALOS: 0    6-Clicks ADL Score:    6-Clicks Mobility Score: 19      Anticipated Discharge Dispo: Discharge Disposition: Discharged to home/self care (01)    DME Needed: Yes    DME Ordered: Yes    Action(s) Taken: Choice form completed for DME walker from [facility] continuity of care and faxed to [facility]. Bedside RN to deliver walker to pt's bedside.    Escalations Completed: None    Medically Clear: No    Next Steps:  f/u with medical team to discuss DC needs and barriers    Barriers to Discharge: Medical clearance

## 2022-10-27 NOTE — ANESTHESIA POSTPROCEDURE EVALUATION
Patient: Yessica Fry    Procedure Summary     Date: 10/26/22 Room / Location:  OR  / SURGERY [facility]    Anesthesia Start: 1609 Anesthesia Stop:     Procedure: LEFT TOTAL KNEE ARTHROPLASTY (Left: Knee) Diagnosis: (OA KNEE LEFT SEVERE)    Surgeons: Ankush England M.D. Responsible Provider: Chevy Pierce M.D.    Anesthesia Type: general, peripheral nerve block ASA Status: 3          Final Anesthesia Type: general, peripheral nerve block  Last vitals  BP   Blood Pressure: 125/69    Temp   36.6 °C (97.9 °F)    Pulse   93   Resp   19    SpO2   100 %      Anesthesia Post Evaluation    Patient location during evaluation: PACU  Patient participation: complete - patient participated  Level of consciousness: awake and alert  Pain score: 3    Airway patency: patent  Anesthetic complications: no  Cardiovascular status: hemodynamically stable  Respiratory status: acceptable  Hydration status: euvolemic    PONV: none          No notable events documented.     Nurse Pain Score: 9 (NPRS)

## 2022-10-27 NOTE — PROGRESS NOTES
Received pt's report from PACU RN. Pt transferred to room 223-2 via bed. Pt is drowsy and AxO3, disoriented to situation. Pt complaints of pain rate at 8/10, medicated per MAR. Pt denies any N/V. Pt is on 10L via O2 mask and tolerating well. V/S monitored. Discussed plan of care. Placed bed in lowest position, call light within reach. Continue to monitor.

## 2022-10-27 NOTE — OR NURSING
1755: Patient arrived to PACU from OR via gurney. Report received from anesthesia and RN. Respirations are spontaneous and unlabored. OPA present. Dressing is CDI. Left leg elevated. Cold pack applied. VSS on 6L. STOPBANG hold in place.    1808: OPA removed    1817: c/o nausea, see MAR.    1823: Pt feeling 5/10 left knee pain. See MAR, PO analgesia not given d/t recent nausea    1830: 6/10 left knee pain, see MAR. Xray at bedside    1835: xray complete    1845: 6/10 left knee pain, see MAR.    1850: Family updated    1855: STOPBANG hold complete. Pt meets criteria for transfer to room. Receiving RN not yet ready.    1900: 5/10 left knee pain, see MAR    1915: 7/10 left knee pain, see MAR.    1935: Report given to receiving RN. Pt transported by this RN.

## 2022-10-27 NOTE — CARE PLAN
The patient is Stable - Low risk of patient condition declining or worsening    Shift Goals  Clinical Goals: Pt will manage pain 3/10 or less, free from fall during this shift  Patient Goals: Pt will able to sleep comfortably with minimal pain  Family Goals: n/a    Progress made toward(s) clinical / shift goals:  Pt complaints of pain, medicated per PRN. Pt complaints of N/V, medicated per MAR.Pt did not fall during this shift    Patient is not progressing towards the following goals:n/a

## 2022-10-27 NOTE — PROGRESS NOTES
4 Eyes Skin Assessment Completed by LILY Choe and LILY Bob.    Head WDL  Ears WDL  Nose WDL  Mouth WDL  Neck WDL  Breast/Chest {Breast/Chest:00677}  Shoulder Blades {Shoulder Blades:21909}  Spine {Spine:00237}  (R) Arm/Elbow/Hand {(R) Arm/Elbow/Hand:08556}  (L) Arm/Elbow/Hand {(L) Arm/Elbow/Hand:72954}  Abdomen {Abdomen:88884}  Groin {Groin:25571}  Scrotum/Coccyx/Buttocks {Scrotum/Coccyx/Buttocks:63586}  (R) Leg {(R) Le}  (L) Leg {(L) Le}  (R) Heel/Foot/Toe {(R) Heel/Foot/Toe:68758}  (L) Heel/Foot/Toe {(L) Heel/Foot/Toe:08474}          Devices In Places {Devices In Place:98633}      Interventions In Place {Interventions In Place:32401}    Possible Skin Injury {Possible Skin Injury:67300}    Pictures Uploaded Into Epic {Pictures Uploaded Into Epic:45862}  Wound Consult Placed {Wound Consult Placed:12275}  RN Wound Prevention Protocol Ordered {YES/NO:}

## 2022-10-28 ENCOUNTER — HOSPITAL ENCOUNTER (EMERGENCY)
Facility: MEDICAL CENTER | Age: 57
End: 2022-10-28
Attending: EMERGENCY MEDICINE
Payer: MEDICAID

## 2022-10-28 VITALS
WEIGHT: 237 LBS | OXYGEN SATURATION: 96 % | DIASTOLIC BLOOD PRESSURE: 70 MMHG | SYSTOLIC BLOOD PRESSURE: 147 MMHG | RESPIRATION RATE: 14 BRPM | TEMPERATURE: 97.4 F | HEIGHT: 65 IN | HEART RATE: 87 BPM | BODY MASS INDEX: 39.49 KG/M2

## 2022-10-28 DIAGNOSIS — G89.18 POSTOPERATIVE PAIN: ICD-10-CM

## 2022-10-28 DIAGNOSIS — Z96.652 STATUS POST TOTAL LEFT KNEE REPLACEMENT: ICD-10-CM

## 2022-10-28 DIAGNOSIS — M79.605 LEFT LEG PAIN: ICD-10-CM

## 2022-10-28 PROCEDURE — 96375 TX/PRO/DX INJ NEW DRUG ADDON: CPT

## 2022-10-28 PROCEDURE — 96374 THER/PROPH/DIAG INJ IV PUSH: CPT

## 2022-10-28 PROCEDURE — 700102 HCHG RX REV CODE 250 W/ 637 OVERRIDE(OP): Performed by: EMERGENCY MEDICINE

## 2022-10-28 PROCEDURE — 302875 HCHG BANDAGE ACE 4 OR 6""

## 2022-10-28 PROCEDURE — 99285 EMERGENCY DEPT VISIT HI MDM: CPT

## 2022-10-28 PROCEDURE — 700111 HCHG RX REV CODE 636 W/ 250 OVERRIDE (IP): Performed by: EMERGENCY MEDICINE

## 2022-10-28 PROCEDURE — A9270 NON-COVERED ITEM OR SERVICE: HCPCS | Performed by: EMERGENCY MEDICINE

## 2022-10-28 RX ORDER — PSEUDOEPHED/ACETAMINOPH/DIPHEN 30MG-500MG
1000 TABLET ORAL EVERY 8 HOURS
COMMUNITY
Start: 2022-10-19

## 2022-10-28 RX ORDER — OXYCODONE HYDROCHLORIDE 5 MG/1
5 TABLET ORAL ONCE
Status: COMPLETED | OUTPATIENT
Start: 2022-10-28 | End: 2022-10-28

## 2022-10-28 RX ORDER — ONDANSETRON 2 MG/ML
4 INJECTION INTRAMUSCULAR; INTRAVENOUS ONCE
Status: COMPLETED | OUTPATIENT
Start: 2022-10-28 | End: 2022-10-28

## 2022-10-28 RX ORDER — OXYCODONE HYDROCHLORIDE 5 MG/1
TABLET ORAL
COMMUNITY

## 2022-10-28 RX ORDER — KETOROLAC TROMETHAMINE 30 MG/ML
15 INJECTION, SOLUTION INTRAMUSCULAR; INTRAVENOUS ONCE
Status: COMPLETED | OUTPATIENT
Start: 2022-10-28 | End: 2022-10-28

## 2022-10-28 RX ORDER — MORPHINE SULFATE 4 MG/ML
4 INJECTION INTRAVENOUS ONCE
Status: COMPLETED | OUTPATIENT
Start: 2022-10-28 | End: 2022-10-28

## 2022-10-28 RX ADMIN — KETOROLAC TROMETHAMINE 15 MG: 30 INJECTION, SOLUTION INTRAMUSCULAR; INTRAVENOUS at 12:06

## 2022-10-28 RX ADMIN — ONDANSETRON 4 MG: 2 INJECTION INTRAMUSCULAR; INTRAVENOUS at 11:42

## 2022-10-28 RX ADMIN — OXYCODONE 5 MG: 5 TABLET ORAL at 13:36

## 2022-10-28 RX ADMIN — MORPHINE SULFATE 4 MG: 4 INJECTION INTRAVENOUS at 11:41

## 2022-10-28 NOTE — ED TRIAGE NOTES
"Chief Complaint   Patient presents with    Leg Swelling     Left leg. Pt dc'd from hospital yesterday after left knee replacement. Pt states through the night pain in the leg got worse and eventually began to \"tingle\" and swell. Swelling noted to leg. Pt states she took her prescribed oxycodone and tylenol this am with no relief. EMS reports temp of 100.4f. Temperature on arrival 99.0F.      Pt biba for above complaint. Pt received toradol IM from ems.     /70   Pulse 82   Temp 37.2 °C (99 °F) (Temporal)   Resp 20   Ht 1.651 m (5' 5\")   Wt 108 kg (237 lb)   SpO2 93%   BMI 39.44 kg/m²     "

## 2022-10-28 NOTE — ED PROVIDER NOTES
"ED Provider Note    CHIEF COMPLAINT  Chief Complaint   Patient presents with    Leg Swelling     Left leg. Pt dc'd from hospital yesterday after left knee replacement. Pt states through the night pain in the leg got worse and eventually began to \"tingle\" and swell. Swelling noted to leg. Pt states she took her prescribed oxycodone and tylenol this am with no relief. EMS reports temp of 100.4f. Temperature on arrival 99.0F.        HPI  Yessica Hernandez Brad Fry is a 57 y.o. female who presents to the emergency department by ambulance from home for left leg pain.  Patient status post total knee replacement by Dr. England at [facility] 2 days ago, the evening of 10/26.  Discharged yesterday.  Pain has been poorly controlled since prior to discharge.  Compliant with home medications without relief.  She has not been icing.  Weightbearing has been difficult although allowed to do so with walker as tolerated.    Patient states she had increasing pain and swelling prior to discharge, ultrasound was reportedly normal.    REVIEW OF SYSTEMS  See HPI for further details. All other systems are negative.     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    SOCIAL HISTORY  Social History     Tobacco Use    Smoking status: Never    Smokeless tobacco: Never   Vaping Use    Vaping Use: Never used   Substance and Sexual Activity    Alcohol use: Not Currently    Drug use: Never    Sexual activity: Not on file       SURGICAL HISTORY   has a past surgical history that includes conor by laparoscopy (2005); other; and total knee arthroplasty (Left, 10/26/2022).    CURRENT MEDICATIONS  Home Medications       Reviewed by Brittani Sharma R.N. (Registered Nurse) on 10/28/22 at 1047  " "Med List Status: Not Addressed     Medication Last Dose Status   albuterol 108 (90 Base) MCG/ACT Aero Soln inhalation aerosol  Active   Ascorbic Acid (VITAMIN C) 1000 MG Tab  Active   atorvastatin (LIPITOR) 40 MG Tab  Active   diclofenac DR (VOLTAREN) 25 MG Tablet Delayed Response  Active   fenofibrate (TRIGLIDE) 160 MG tablet  Active   fluoxetine (PROZAC) 10 MG Cap  Active   furosemide (LASIX) 20 MG Tab  Active   losartan (COZAAR) 50 MG Tab  Active   metoprolol (LOPRESSOR) 25 MG Tab  Active   omeprazole (PRILOSEC) 20 MG delayed-release capsule  Active   potassium chloride (MICRO-K) 10 MEQ capsule  Active                    ALLERGIES  Allergies   Allergen Reactions    Keflex Unspecified     Pt. States she ended up in the hospital       PHYSICAL EXAM  VITAL SIGNS: BP (!) 147/70   Pulse 87   Temp 37.2 °C (99 °F) (Temporal)   Resp 14   Ht 1.651 m (5' 5\")   Wt 108 kg (237 lb)   SpO2 96%   BMI 39.44 kg/m²   Pulse ox interpretation: I interpret this pulse ox as normal.  Constitutional: Alert.  Mild distress secondary discomfort.  HENT: Normocephalic, atraumatic. Bilateral external ears normal, Nose normal.   Eyes: Pupils are equal and reactive, Conjunctiva normal.   Neck: Normal range of motion  Lymphatic: No lymphadenopathy noted.   Cardiovascular: Normal peripheral perfusion  Thorax & Lungs:  nonlabored respirations  Skin: Warm, Dry  Musculoskeletal: Left lower extremity slightly swollen compared to left, upper leg is soft but somewhat tender at the distal femur, fullness in the calf.  Anterior midline vertical incision with dried blood, otherwise.  Circumferential swelling without edema.  2+ DP.  Wiggles toes, sensation tact light touch.   Neurologic: Alert and oriented x4  Psychiatric: Affect normal, Judgment normal, Mood normal.       COURSE & MEDICAL DECISION MAKING  Nursing notes and vital signs were reviewed. (See chart for details)  The patient  records were reviewed, history was obtained from the patient " ;     Medical record review: Records reviewed from [facility] with the discharge yesterday 10/27, after overnight stay after 8 PM total knee replacement.  Patient did have regional block during initial treatment.  She had worsening pain and swelling yesterday.  Ultrasound was negative for DVT or other gross abnormality.    Patient seen evaluated bedside on arrival.  Mild distress secondary discomfort.  Leg is swollen, tender but CMS intact distally.  No clinical evidence for cellulitis, necrotizing fasciitis.  Start with pain control.  Will discuss with orthopedics.    Patient's pain is much improved after morphine and Toradol.    , on-call for Dr. England, orthopedics, is aware of patient presentation and exam.  Given present since prior to discharge yesterday and normal ultrasound, suspects poor postoperative pain control.  Agreed with interventions thus far, trial discharge home, encourage icing as well as compliance with medications.  If pain cannot be controlled she can be admitted for the same.    Patient is aware of surgery recommendations.  Ambulated with a walker at baseline without significant discomfort.  Use the restroom with ease.  Comfortable going home to start icing, encouraged to use oxycodone and Tylenol around-the-clock, alternating timing so that she is receiving something more often than every 2 hours.  She will follow-up with orthopedics as scheduled next week.    Patient is stable for discharge at this time, anticipatory guidance provided, continue home medications, close follow-up is encouraged, and strict ED return instructions have been detailed. Patient is agreeable to the disposition and plan.      FINAL IMPRESSION  (G89.18) Postoperative pain  (M79.605) Left leg pain  (Z96.652) Status post total left knee replacement      Electronically signed by: Giovanna Slater D.O., 10/28/2022 2:18 PM      This dictation was created using voice recognition software. The accuracy of the  dictation is limited to the abilities of the software. I expect there may be some errors of grammar and possibly content. The nursing notes were reviewed and certain aspects of this information were incorporated into this note.

## 2022-10-28 NOTE — ED NOTES
Pt's knee re wrapped with ace bandage, pt up with a walker and able to ambulate to the bathroom in back

## 2022-10-28 NOTE — PROGRESS NOTES
Discharge paper work reviewed with patient and  at bedside.Copy given.Questions encouraged and aswered. I.V removed. Patient escorted to ED entrance via wheelchair.Patient discharge to home with DME walker.

## 2022-10-28 NOTE — ED NOTES
Pt provided with discharge instructions. Pt verbalized understanding. PIV removed and pt assisted out of ed via WC.

## 2022-10-29 LAB — BLOOD CULTURE HOLD CXBCH: NORMAL

## (undated) DEVICE — SCREW, HEADED

## (undated) DEVICE — BAG SPONGE COUNT 10.25 X 32 - BLUE (250/CA)

## (undated) DEVICE — BOVIE BLADE COATED &INSULATED - 25/PK

## (undated) DEVICE — LENS/HOOD FOR SPACESUIT - (32/PK) PEEL AWAY FACE

## (undated) DEVICE — TIP INTPLS HFLO ML ORFC BTRY - (12/CS)  FOR SURGILAV

## (undated) DEVICE — NEEDLE SPINAL NON-SAFETY 18 GA X 3 IN (25EA/BX)

## (undated) DEVICE — SODIUM CHL IRRIGATION 0.9% 1000ML (12EA/CA)

## (undated) DEVICE — CONTAINER SPECIMEN BAG OR - STERILE 4 OZ W/LID (100EA/CA)

## (undated) DEVICE — HANDPIECE 10FT INTPLS SCT PLS IRRIGATION HAND CONTROL SET (6/PK)

## (undated) DEVICE — PACK TOTAL KNEE  (1/CA)

## (undated) DEVICE — DRAPE SRG LG BCK TBL DISP - 10/CA

## (undated) DEVICE — GOWN WARMING STANDARD FLEX - (30/CA)

## (undated) DEVICE — BANDAGE ROLL STERILE BULKEE 4.5 IN X 4 YD (100EA/CA)

## (undated) DEVICE — PIN, DRILL

## (undated) DEVICE — ELECTRODE DUAL RETURN W/ CORD - (50/PK)

## (undated) DEVICE — CHLORAPREP 26 ML APPLICATOR - ORANGE TINT(25/CA)

## (undated) DEVICE — STOCKINET TUBULAR 6IN STERILE - 6 X 48YDS (25/CA)

## (undated) DEVICE — SENSOR OXIMETER ADULT SPO2 RD SET (20EA/BX)

## (undated) DEVICE — LACTATED RINGERS INJ 1000 ML - (14EA/CA 60CA/PF)

## (undated) DEVICE — CANISTER SUCTION RIGID RED 1500CC (40EA/CA)

## (undated) DEVICE — HUMID-VENT HEAT AND MOISTURE EXCHANGE- (50/BX)

## (undated) DEVICE — SUCTION INSTRUMENT YANKAUER BULBOUS TIP W/O VENT (50EA/CA)

## (undated) DEVICE — GLOVE, LITE (PAIR)

## (undated) DEVICE — TOWEL STOP TIMEOUT SAFETY FLAG (40EA/CA)

## (undated) DEVICE — WATER IRRIGATION STERILE 1000ML (12EA/CA)

## (undated) DEVICE — TUBE CONNECTING SUCTION - CLEAR PLASTIC STERILE 72 IN (50EA/CA)

## (undated) DEVICE — SUTURE GENERAL